# Patient Record
Sex: MALE | Race: BLACK OR AFRICAN AMERICAN | Employment: FULL TIME | URBAN - METROPOLITAN AREA
[De-identification: names, ages, dates, MRNs, and addresses within clinical notes are randomized per-mention and may not be internally consistent; named-entity substitution may affect disease eponyms.]

---

## 2019-10-01 ENCOUNTER — HOSPITAL ENCOUNTER (OUTPATIENT)
Dept: PHYSICAL THERAPY | Age: 69
Discharge: HOME | End: 2019-10-01
Payer: MEDICARE

## 2019-10-01 ENCOUNTER — HOSPITAL ENCOUNTER (OUTPATIENT)
Dept: PHYSICAL THERAPY | Age: 69
End: 2019-10-01
Payer: MEDICARE

## 2019-10-01 PROCEDURE — 97165 OT EVAL LOW COMPLEX 30 MIN: CPT

## 2019-10-01 PROCEDURE — 97018 PARAFFIN BATH THERAPY: CPT

## 2019-10-01 PROCEDURE — 97110 THERAPEUTIC EXERCISES: CPT

## 2019-10-01 NOTE — PROGRESS NOTES
[facility] [place] [employer]  [place]  [address] [facility]  [phone] fax    Plan of Care/Statement of Necessity for Occupational Therapy Services    Patient name: Lee Allen Start of Care: 10/1/2019   Referral source: Tito Camelia  : 1950    Medical Diagnosis: Pain of left thumb [M79.645]  Payor: [place] MEDICARE / Plan: [place] MEDICARE PART A & B / Product Type: Medicare /  Onset Date:unknown    Treatment Diagnosis: pain left thumb   Prior Hospitalization: see medical history Provider#: [id number]   Medications: Verified on Patient summary List    Comorbidities: B CTR, DM, HTN   Prior Level of Function: [employer]ired [employer], 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 Assessment/ key information: 76year old right hand dominant male who had slow onset of left thumb and wrist pain several years ago which comes and goes. He reports pain of 5/10. He has limited thumb AROM as follows with right hand in ( ) for comparison:  MP  0-40 (60), IP 0-60 (0-40), radial abduction 38 (52), palmar abduction 45 (48). His  and pinch are WFL. He is tender to palpation at [facility]. He has inconclusive Finklesteins test.  Thumb radial abduction is severely limited. He reports residual numbness in left thumb tip following left CTR. His fine motor skill sin left are thus reduced to 27 ( right 22). He reports difficulty with fasteners, Lifting, carrying. His FOTO is 63/100 reflecting slight impairment in function. He will benefit from skilled occupational therapy to improve left thumb ROM, reduce pain and increase function for fasteners and lifting.       Evaluation Complexity: History LOW Complexity : Brief history review  Examination LOW Complexity : 1-3 performance deficits relating to physical, cognitive , or psychosocial skils that result in activity limitations and / or participation restrictions  Clinical Decision Making LOW Complexity : No comorbidities that affect functional and no verbal or physical assistance needed to complete eval tasks   Overall Complexity Rating: LOW     Problem List: Pain effecting function, Decreased range of motion, Decreased coordination/prehension, Decreased ADL/functional abilities , Decreased flexibility/joint mobility and Sensability     Treatment Plan may include any combination of the following: Therapeutic exercise, Therapeutic activities, Physical agent/modality, Manual therapy, Patient education and ADLs/IADLs    Patient / Family readiness to learn indicated by: asking questions, trying to perform skills and interest    Persons(s) to be included in education:   patient (P)    Barriers to Learning/Limitations: None    Patient Goal (s): pain relief    Patient Self Reported Health Status: good    Rehabilitation Potential: excellent    Short Term Goals: To be accomplished in 2 weeks:  1. Patient will be independent in home exercise program for thumb ROM. 2.  Patient will be independent in beak ligament stretch to improve thumb abduction. 3.  Patient will be independent in home program for stabilizing exercises for left thumb. Long Term Goals: To be accomplished in 4 weeks:   1. Patient will improve left thumb radial abduction by at least 10 degrees to allow for improved grasp without pain. 2. Patient will report pain in left hand reduced to 0-2/10 with work and leisure activities. 3.  Patient will be able to perform fasteners with improved ease with left hand due to reduced thumb pain. 4.  Patient will report improved performance in lifting and carrying tasks as shown by increase in FOTO of at elast 5 points.         Frequency / Duration: Patient to be seen 2 times per week for 4 weeks:    Patient/ Caregiver education and instruction: Diagnosis, prognosis, self care, activity modification and exercises  [x] Plan of care has been reviewed with TOMPKINS    Certification Period: 10/1/19-10/31/19    Talya Camara, OTR/L 10/1/2019 1:27 PM      ________________________________________________________________________    I certify that the above Therapy Services are being furnished while the patient is under my care. I agree with the treatment plan and certify that this therapy is necessary.     Physician's Signature:____________Date:_________TIME:________    ** Signature, Date and Time must be completed for valid certification **    Please sign and return to [facility] [employer] [facility] [employer]   [address] [place] [address]  [phone] fax

## 2019-10-01 NOTE — PROGRESS NOTES
OT DAILY TREATMENT NOTE 10-18    Patient Name: Mitchell Johnston  Date:10/1/2019  : 1950  [x]  Patient  Verified  Payor: Juan Miguel Martins / Plan: Maurice Israel / Product Type: Commerical /    In time:1220  Out time:100  Total Treatment Time (min): 40  Visit #: 1 of 8    Medicare/BCBS Only   Total Timed Codes (min):  13 1:1 Treatment Time:  40     Treatment Area: Pain of left thumb [M79.645]    SUBJECTIVE  Pain Level (0-10 scale): 5/10  Any medication changes, allergies to medications, adverse drug reactions, diagnosis change, or new procedure performed?: [x] No    [] Yes (see summary sheet for update)  Subjective functional status/changes:   [] No changes reported  It feels better already!     OBJECTIVE    Modality rationale: decrease pain and increase tissue extensibility to improve the patients ability to grasp   Min Type Additional Details    [] Estim:  []Unatt       []IFC  []Premod                        []Other:  []w/ice   []w/heat  Position:  Location:    [] Estim: []Att    []TENS instruct  []NMES                    []Other:  []w/US   []w/ice   []w/heat  Position:  Location:    []  Traction: [] Cervical       []Lumbar                       [] Prone          []Supine                       []Intermittent   []Continuous Lbs:  [] before manual  [] after manual    []  Ultrasound: []Continuous   [] Pulsed                           []1MHz   []3MHz W/cm2:  Location:    []  Iontophoresis with dexamethasone         Location: [] Take home patch   [] In clinic    []  Ice     []  heat  []  Ice massage  []  Laser   []  Anodyne Position:  Location:    []  Laser with stim  [x]  Other: paraffin 10 Position:  Location:left hand    []  Vasopneumatic Device Pressure:       [] lo [] med [] hi   Temperature: [] lo [] med [] hi       [x] Skin assessment post-treatment:  [x]intact []redness- no adverse reaction    []redness - adverse reaction:     17 min [x]Eval                  []Re-Eval       8 min Therapeutic Exercise: [] See flow sheet :   Rationale: increase ROM to improve the patients ability to move thumb    Thumb rad abd, palmar abd and C and L x 10 each left  Thumb hyperext x 10 left    5 min Manual therapy Beak ligament release  Instructed in beak ligament stretch for ROM radial abduction left    With   [] TE   [] TA   [] neuro   [] other: Patient Education: [x] Review HEP    [] Progressed/Changed HEP based on:   [] positioning   [] body mechanics   [] transfers   [] heat/ice application   [] Splint wear/care   [] Sensory re-education   [] scar management      [] other:            Other Objective/Functional Measures:   Subjective: pt is a right hand dominant, 76 y.o.y/o, male who has had slow onset of left thumb pain with no apparent injury  Prior level of function: REtired shipyard, I self care driving, works cleaning at food court  Pain level:(0-no pain 10-debilitating pain) moderate    Description/Location: left thumb and left wrist with c/o intermittent relief   Worst pain10/10 Least pain 0/10   Activities which aggravate pain: unknown   Activities which ease pain: heat  Current functional limitations/living situation: with wife, difficulty with buttons, fasteners    Medical hx: B CTR, Dm, HTN    Medications: See the written copy of this report in the patient's paper medical record. Objective:    Sensation:  Light Touch []WFL          [x] Impaired left thumb tip since before CTR   Sharp/Dull []WFL          [] Impaired    Pain/Temperature []WFL          [] Impaired        Hand ROM R/L   Index Middle Ring Small Thumb    MP      CMC   PIP     60/40 MP   DIP     40/60 IP        48/45 Sanders Abd        52/38 Radial Abd     Hand Strength:   Gross Grasp 3pt Pinch Lateral Pinch Tip Pinch   Right  65 18 26 14   Left 75 20 22 14     Nine-Hole Peg Test:  Left= ___27__seconds  Right=_22____seconds  Finger Opposition:  To 5th tip        Palpation: CMC pain    ADLs  Feeding:        []MaxA   []ModA   []Carlos   [] CGA   []SBA []Yandel   [x]Independent  UE Dressing:       []MaxA   []ModA   [x]Carlos   [] CGA   []SBA   []Yandel   []Independent  LE Dressing:       []MaxA   []ModA   [x]Carlos   [] CGA   []SBA   []Yandel   []Independent  Grooming:       []MaxA   []ModA   []Carlos   [] CGA   []SBA   []Yandel   [x]Independent  Toileting:       []MaxA   []ModA   []Carlos   [] CGA   []SBA   []Yandel   [x]Independent  Bathing:       []MaxA   []ModA   []Carlos   [] CGA   []SBA   []Yandel   [x]Independent  Light Meal Prep:    []MaxA   []ModA   []Carlos   [] CGA   []SBA   []Yandel   []Independent  Household/Other: []MaxA   []ModA   []Carlos   [] CGA   []SBA   []Yandel   []Independent  Adaptive Equip:     []MaxA   []ModA   []Carlos   [] CGA   []SBA   []Yandel   []Independent  Driving:       []MaxA   []ModA   []Carlos   [] CGA   []SBA   []Yandel   []Independent  Money Mgmt:        []MaxA   []ModA   []Carlos   [] CGA   []SBA   []Yandel   []Independent       Pain Level (0-10 scale) post treatment: 3/10    ASSESSMENT/Changes in Function: *   [x]  See Plan of Care  []  See progress note/recertification  []  See Discharge Summary             PLAN  []  Upgrade activities as tolerated     []  Continue plan of care  []  Update interventions per flow sheet       []  Discharge due to:_  []  Other:_      SAMM Gandhi/L 10/1/2019  12:18 PM    No future appointments.

## 2019-10-04 ENCOUNTER — HOSPITAL ENCOUNTER (OUTPATIENT)
Dept: PHYSICAL THERAPY | Age: 69
Discharge: HOME | End: 2019-10-04
Payer: MEDICARE

## 2019-10-04 PROCEDURE — 97110 THERAPEUTIC EXERCISES: CPT

## 2019-10-04 PROCEDURE — 97140 MANUAL THERAPY 1/> REGIONS: CPT

## 2019-10-04 PROCEDURE — 97018 PARAFFIN BATH THERAPY: CPT

## 2019-10-04 NOTE — PROGRESS NOTES
OT DAILY TREATMENT NOTE 10-18    Patient Name: Jaja Hylton.   Date:10/4/2019  : 1950  [x]  Patient  Verified  Payor: VA MEDICARE / Plan: VA MEDICARE PART A & B / Product Type: Medicare /    In time:900  Out time:945  Total Treatment Time (min): 45  Visit #: 2 of 8    Medicare/BCBS Only   Total Timed Codes (min):  35 1:1 Treatment Time:  45     Treatment Area: Pain of left thumb [M79.645]    SUBJECTIVE  Pain Level (0-10 scale): 3/10  Any medication changes, allergies to medications, adverse drug reactions, diagnosis change, or new procedure performed?: [x] No    [] Yes (see summary sheet for update)  Subjective functional status/changes:   [] No changes reported  I can see the difference the exercises make     OBJECTIVE           Modality rationale: decrease pain and increase tissue extensibility to improve the patients ability to grasp   Min Type Additional Details      [] Estim:  []Unatt       []IFC  []Premod                        []Other:  []w/ice   []w/heat  Position:  Location:      [] Estim: []Att    []TENS instruct  []NMES                    []Other:  []w/US   []w/ice   []w/heat  Position:  Location:      []  Traction: [] Cervical       []Lumbar                       [] Prone          []Supine                       []Intermittent   []Continuous Lbs:  [] before manual  [] after manual      []  Ultrasound: []Continuous   [] Pulsed                           []1MHz   []3MHz W/cm2:  Location:      []  Iontophoresis with dexamethasone         Location: [] Take home patch   [] In clinic      []  Ice     []  heat  []  Ice massage  []  Laser   []  Anodyne Position:  Location:     10 []  Laser with stim  [x]  Other: paraffin 10 Position:  Location:left hand      []  Vasopneumatic Device Pressure:       [] lo [] med [] hi   Temperature: [] lo [] med [] hi       [x] Skin assessment post-treatment:  [x]intact []redness- no adverse reaction  []redness - adverse reaction:     25 min Therapeutic Exercise:  [] See flow sheet :   Rationale: increase ROM and increase strength to improve the patients ability to freely use hand, thumb     Thumb:   Radial Abduction, Palmar Abduction, MP Hyperextension, Composite Abduction    Opposition with Foam: \"C\"     10 min Manual Therapy:  Ligament release   Rationale: decrease pain, increase ROM and increase tissue extensibility to improve ability to functionally use hand, move thumb freely     Beak ligament release  Palmar Stretch         With   [] TE   [] TA   [] neuro   [] other: Patient Education: [x] Review HEP    [] Progressed/Changed HEP based on:   [] positioning   [] body mechanics   [] transfers   [] heat/ice application   [] Splint wear/care   [] Sensory re-education   [] scar management      [] other:            Other Objective/Functional Measures:     Cueing to not pull thumb down for HEP, cueing to hold      Pain Level (0-10 scale) post treatment: 2/10    ASSESSMENT/Changes in Function: ROM improving, Patient reporting improved function at this time    Patient will continue to benefit from skilled OT services to modify and progress therapeutic interventions, address ROM deficits, address strength deficits and instruct in home and community integration to attain remaining goals. []  See Plan of Care  []  See progress note/recertification  []  See Discharge Summary         Progress towards goals / Updated goals:    Short Term Goals: To be accomplished in 2 weeks:  1. Patient will be independent in home exercise program for thumb ROM. Cueing required 10/4/19  2. Patient will be independent in beak ligament stretch to improve thumb abduction. Goal Met 10/4/19  3. Patient will be independent in home program for stabilizing exercises for left thumb.     Long Term Goals: To be accomplished in 4 weeks:              1.  Patient will improve left thumb radial abduction by at least 10 degrees to allow for improved grasp without pain.   2. Patient will report pain in left hand reduced to 0-2/10 with work and leisure activities. 3.  Patient will be able to perform fasteners with improved ease with left hand due to reduced thumb pain. 4.  Patient will report improved performance in lifting and carrying tasks as shown by increase in FOTO of at elast 5 points.     PLAN  []  Upgrade activities as tolerated     [x]  Continue plan of care  []  Update interventions per flow sheet       []  Discharge due to:_  []  Other:_      Inell SARAH Varner 10/4/2019  8:27 AM    Future Appointments   Date Time Provider Ady Gay   10/9/2019 10:15 AM Emily OLEARY [facility]   10/11/2019  7:30 AM Emily TIRADO [facility]

## 2019-10-09 ENCOUNTER — HOSPITAL ENCOUNTER (OUTPATIENT)
Dept: PHYSICAL THERAPY | Age: 69
Discharge: HOME | End: 2019-10-09
Payer: MEDICARE

## 2019-10-09 PROCEDURE — 97032 APPL MODALITY 1+ESTIM EA 15: CPT

## 2019-10-09 PROCEDURE — 97110 THERAPEUTIC EXERCISES: CPT

## 2019-10-09 PROCEDURE — 97140 MANUAL THERAPY 1/> REGIONS: CPT

## 2019-10-09 NOTE — PROGRESS NOTES
OT DAILY TREATMENT NOTE 10-18    Patient Name: Kaylyn Harrison. Date:10/9/2019  : 1950  [x]  Patient  Verified  Payor: VA MEDICARE / Plan: VA MEDICARE PART A & B / Product Type: Medicare /    In time:945  Out time:1025  Total Treatment Time (min): 40  Visit #: 3 of 8    Medicare/BCBS Only   Total Timed Codes (min):  30 1:1 Treatment Time:  40     Treatment Area: Pain of left thumb [M79.645]    SUBJECTIVE  Pain Level (0-10 scale): 5/10  Any medication changes, allergies to medications, adverse drug reactions, diagnosis change, or new procedure performed?: [x] No    [] Yes (see summary sheet for update)  Subjective functional status/changes:   [] No changes reported  It was hurting a lot more yesterday. Maybe it does have something to do with the weather.        OBJECTIVE              Modality rationale: decrease pain and increase tissue extensibility to improve the patients ability to grasp   Min Type Additional Details       [] Estim:  []Unatt       []IFC  []Premod                        []Other:  []w/ice   []w/heat  Position:  Location:       [] Estim: [x]Att    []TENS instruct  []NMES                    []Other:  []w/US   []w/ice   []w/heat  Position:  Location:       []  Traction: [] Cervical       []Lumbar                       [] Prone          []Supine                       []Intermittent   []Continuous Lbs:  [] before manual  [] after manual       []  Ultrasound: []Continuous   [] Pulsed                           []1MHz   []3MHz W/cm2:  Location:       []  Iontophoresis with dexamethasone         Location: [] Take home patch   [] In clinic       []  Ice     []  heat  []  Ice massage  []  Laser   []  Anodyne Position:  Location:      8/10 [x]  Laser with stim   [x]  Other: paraffin 10 Position:  Location:left hand       []  Vasopneumatic Device Pressure:       [] lo [] med [] hi   Temperature: [] lo [] med [] hi        [x] Skin assessment post-treatment:  [x]intact []redness- no adverse reaction  []redness - adverse reaction:     10 min Therapeutic Exercise:  [] See flow sheet :   Rationale: increase ROM and increase strength to improve the patients ability to functionally use hand     Thumb:              Radial Abduction, Palmar Abduction, MP Hyperextension, Composite Abduction      12 min Manual Therapy:  Edema Massage/Beak ligament release   Rationale: decrease pain, increase ROM, increase tissue extensibility and decrease edema  to improve function of Left hand, move thumb freely     Edema Massage to thenar, thenar eminence  Palmar Stretch   Beak ligament release          With   [] TE   [] TA   [] neuro   [] other: Patient Education: [x] Review HEP    [] Progressed/Changed HEP based on:   [] positioning   [] body mechanics   [] transfers   [] heat/ice application   [] Splint wear/care   [] Sensory re-education   [] scar management      [] other:            Other Objective/Functional Measures:     Decreased swelling post massage     Cueing to hold with HEP      Pain Level (0-10 scale) post treatment: 2/10    ASSESSMENT/Changes in Function: decreased pain with modalities     Patient will continue to benefit from skilled OT services to modify and progress therapeutic interventions, address ROM deficits, address strength deficits and instruct in home and community integration to attain remaining goals. []  See Plan of Care  []  See progress note/recertification  []  See Discharge Summary         Progress towards goals / Updated goals:  Short Term Goals: To be accomplished in 2 weeks:  1.  Patient will be independent in home exercise program for thumb ROM. Cueing required 10/4/19  2.  Patient will be independent in beak ligament stretch to improve thumb abduction.  Goal Met 10/4/19  3.  Patient will be independent in home program for stabilizing exercises for left thumb.     Long Term Goals: To be accomplished in 4 weeks:              1.  Patient will improve left thumb radial abduction by at least 10 degrees to allow for improved grasp without pain. Progressing with in clinic tasks/HEP 10/9/19  2. Patient will report pain in left hand reduced to 0-2/10 with work and leisure activities. 3.  Patient will be able to perform fasteners with improved ease with left hand due to reduced thumb pain.   4.  Patient will report improved performance in lifting and carrying tasks as shown by increase in FOTO of at elast 5 points.      PLAN  []  Upgrade activities as tolerated     [x]  Continue plan of care  []  Update interventions per flow sheet       []  Discharge due to:_  []  Other:_      [place] [employer] ,TOMPKINS/L 10/9/2019  9:12 AM    Future Appointments   Date Time Provider Ady Gay   10/11/2019  7:30 AM Severiano Reddish AXIKUHG [facility]   10/15/2019 10:30 AM Herminia Lowe VNQMIKE [facility]   10/18/2019  8:15 AM Severiano Reddish KCUFEJY [facility]

## 2019-10-11 ENCOUNTER — HOSPITAL ENCOUNTER (OUTPATIENT)
Dept: PHYSICAL THERAPY | Age: 69
Discharge: HOME | End: 2019-10-11
Payer: MEDICARE

## 2019-10-11 PROCEDURE — 97032 APPL MODALITY 1+ESTIM EA 15: CPT

## 2019-10-11 PROCEDURE — 97018 PARAFFIN BATH THERAPY: CPT

## 2019-10-11 PROCEDURE — 97530 THERAPEUTIC ACTIVITIES: CPT

## 2019-10-11 NOTE — PROGRESS NOTES
OT DAILY TREATMENT NOTE 10-18    Patient Name: Charley Farias.   Date:10/11/2019  : 1950  [x]  Patient  Verified  Payor: Ashley Turner / Plan: VA MEDICARE PART A & B / Product Type: Medicare /    In time:730  Out time:810  Total Treatment Time (min): 40  Visit #: 4 of 8    Medicare/BCBS Only   Total Timed Codes (min):  30 1:1 Treatment Time:  40     Treatment Area: Pain of left thumb [M79.645]    SUBJECTIVE  Pain Level (0-10 scale): 3/10  Any medication changes, allergies to medications, adverse drug reactions, diagnosis change, or new procedure performed?: [x] No    [] Yes (see summary sheet for update)  Subjective functional status/changes:   [] No changes reported  No pain really     OBJECTIVE                Modality rationale: decrease pain and increase tissue extensibility to improve the patients ability to grasp   Min Type Additional Details       [] Estim:  []Unatt       []IFC  []Premod                        []Other:  []w/ice   []w/heat  Position:  Location:       [] Estim: [x]Att    []TENS instruct  []NMES                    []Other:  []w/US   []w/ice   []w/heat  Position:  Location:       []  Traction: [] Cervical       []Lumbar                       [] Prone          []Supine                       []Intermittent   []Continuous Lbs:  [] before manual  [] after manual       []  Ultrasound: []Continuous   [] Pulsed                           []1MHz   []3MHz W/cm2:  Location:       []  Iontophoresis with dexamethasone         Location: [] Take home patch   [] In clinic       []  Ice     []  heat  []  Ice massage  []  Laser   []  Anodyne Position:  Location:      8/10 [x]  Laser with stim   [x]  Other: paraffin 10 Position:  Location:left hand       []  Vasopneumatic Device Pressure:       [] lo [] med [] hi   Temperature: [] lo [] med [] hi        [x] Skin assessment post-treatment:  [x]intact []redness- no adverse reaction  []redness - adverse reaction:     20 min Therapeutic Activity:  []  See flow sheet :   Rationale: increase ROM and improve coordination  to improve the patients ability to pinch, grasp, manipulate small items    1/4 in pegs: using recip opp to place/remove pegs 35x            With   [] TE   [] TA   [] neuro   [] other: Patient Education: [x] Review HEP    [] Progressed/Changed HEP based on:   [] positioning   [] body mechanics   [] transfers   [] heat/ice application   [] Splint wear/care   [] Sensory re-education   [] scar management      [] other:            Other Objective/Functional Measures:     Cueing for proper positioning      Pain Level (0-10 scale) post treatment: 0/10    ASSESSMENT/Changes in Function: ROM improving, decrease in pain     Patient will continue to benefit from skilled OT services to modify and progress therapeutic interventions, address ROM deficits, address strength deficits and instruct in home and community integration to attain remaining goals. []  See Plan of Care  []  See progress note/recertification  []  See Discharge Summary         Progress towards goals / Updated goals:  Short Term Goals: To be accomplished in 2 weeks:  1.  Patient will be independent in home exercise program for thumb ROM. Cueing required 10/4/19  2.  Patient will be independent in beak ligament stretch to improve thumb abduction. Goal Met 10/4/19  3.  Patient will be independent in home program for stabilizing exercises for left thumb.     Long Term Goals: To be accomplished in 4 weeks:              1.  Patient will improve left thumb radial abduction by at least 10 degrees to allow for improved grasp without pain. Progressing with in clinic tasks/HEP 10/9/19  2. Patient will report pain in left hand reduced to 0-2/10 with work and leisure activities. Met for today, check for consistency 10/11/19  3.  Patient will be able to perform fasteners with improved ease with left hand due to reduced thumb pain.   4.  Patient will report improved performance in lifting and carrying tasks as shown by increase in FOTO of at elast 5 points.      PLAN  []  Upgrade activities as tolerated     [x]  Continue plan of care  []  Update interventions per flow sheet        []  Discharge due to:_  []  Other:_      SARAH Busch 10/11/2019  7:39 AM    Future Appointments   Date Time Provider Ady Gay   10/15/2019 10:30 AM Maxime Reeves XTSLTHA [facility]   10/18/2019  8:15 AM Severa Saba IJGGJANINE [facility]

## 2019-10-15 ENCOUNTER — HOSPITAL ENCOUNTER (OUTPATIENT)
Dept: PHYSICAL THERAPY | Age: 69
Discharge: HOME | End: 2019-10-15
Payer: MEDICARE

## 2019-10-15 PROCEDURE — 97110 THERAPEUTIC EXERCISES: CPT

## 2019-10-15 PROCEDURE — 97018 PARAFFIN BATH THERAPY: CPT

## 2019-10-15 PROCEDURE — 97140 MANUAL THERAPY 1/> REGIONS: CPT

## 2019-10-15 NOTE — PROGRESS NOTES
OT DAILY TREATMENT NOTE 10-18    Patient Name: Valentín Andrews. Date:10/15/2019  : 1950  [x]  Patient  Verified  Payor: Christal Hind / Plan: [place] & [place] / Product Type: Medicare /    In time:   Out time:   Total Treatment Time (min): 45  Visit #: 5 of 8    Medicare/BCBS Only   Total Timed Codes (min):  35 1:1 Treatment Time:  45     Treatment Area: Pain of left thumb [M79.645]    SUBJECTIVE  Pain Level (0-10 scale): 3/10  Any medication changes, allergies to medications, adverse drug reactions, diagnosis change, or new procedure performed?: [x] No    [] Yes (see summary sheet for update)  Subjective functional status/changes:   [] No changes reported  Not too bad, it's manageable.  It's been going well with the exercises     OBJECTIVE                 Modality rationale: decrease pain and increase tissue extensibility to improve the patients ability to grasp   Min Type Additional Details       [] Estim:  []Unatt       []IFC  []Premod                        []Other:  []w/ice   []w/heat  Position:  Location:       [] Estim: [x]Att    []TENS instruct  []NMES                    []Other:  []w/US   []w/ice   []w/heat  Position:  Location:       []  Traction: [] Cervical       []Lumbar                       [] Prone          []Supine                       []Intermittent   []Continuous Lbs:  [] before manual  [] after manual       []  Ultrasound: []Continuous   [] Pulsed                           []1MHz   []3MHz W/cm2:  Location:       []  Iontophoresis with dexamethasone         Location: [] Take home patch   [] In clinic       []  Ice     []  heat  []  Ice massage  []  Laser   []  Anodyne Position:  Location:      8/10 [x]  Laser with stim   [x]  Other: paraffin 10 Position:  Location:left hand       []  Vasopneumatic Device Pressure:       [] lo [] med [] hi   Temperature: [] lo [] med [] hi        [x] Skin assessment post-treatment:  [x]intact []redness- no adverse reaction  []redness - adverse reaction:     10 min Therapeutic Exercise:  [] See flow sheet :   Rationale: increase ROM and increase strength to improve the patients ability to use left hand     Thumb 10x each:    Radial abduction, Palmar Abduction, MP Hyperextension, Composite Abdcution      12 min Manual Therapy:  Edema massage/beak ligament release    Rationale: decrease pain, increase ROM, increase tissue extensibility and decrease edema  to improve function of left hand, move thumb freely     Edema massage to thenar, thenar eminence  Palmar stretch  Beak ligament release         With   [] TE   [] TA   [] neuro   [] other: Patient Education: [x] Review HEP    [] Progressed/Changed HEP based on:   [] positioning   [] body mechanics   [] transfers   [] heat/ice application   [] Splint wear/care   [] Sensory re-education   [] scar management      [] other:            Other Objective/Functional Measures: decreased swelling post massage     Pain Level (0-10 scale) post treatment: 2/10    ASSESSMENT/Changes in Function: decreased pain with modalities in clinic     Patient will continue to benefit from skilled OT services to modify and progress therapeutic interventions, address ROM deficits, address strength deficits and instruct in home and community integration to attain remaining goals. []  See Plan of Care  []  See progress note/recertification  []  See Discharge Summary         Progress towards goals / Updated goals:  Short Term Goals: To be accomplished in 2 weeks:  1.  Patient will be independent in home exercise program for thumb ROM. Cueing required 10/4/19  2.  Patient will be independent in beak ligament stretch to improve thumb abduction. Goal Met 10/4/19, able to demo 10/15  3.  Patient will be independent in home program for stabilizing exercises for left thumb, Goal met 10/15     Long Term Goals: To be accomplished in 4 weeks:              1.  Patient will improve left thumb radial abduction by at least 10 degrees to allow for improved grasp without pain. Progressing with in clinic tasks/HEP 10/9/19  2. Patient will report pain in left hand reduced to 0-2/10 with work and leisure activities. Met for today, check for consistency 10/11/19  3.  Patient will be able to perform fasteners with improved ease with left hand due to reduced thumb pain.   4.  Patient will report improved performance in lifting and carrying tasks as shown by increase in FOTO of at elast 5 points.      PLAN  []  Upgrade activities as tolerated     [x]  Continue plan of care  []  Update interventions per flow sheet       []  Discharge due to:_  []  Other:_      SARAH Chan 10/15/2019  10:37 AM    Future Appointments   Date Time Provider Ady Gay   10/18/2019  8:15 AM Xiomy Sotomayor [id number]

## 2019-10-18 ENCOUNTER — HOSPITAL ENCOUNTER (OUTPATIENT)
Dept: PHYSICAL THERAPY | Age: 69
Discharge: HOME | End: 2019-10-18
Payer: MEDICARE

## 2019-10-18 PROCEDURE — 97530 THERAPEUTIC ACTIVITIES: CPT

## 2019-10-18 PROCEDURE — 97110 THERAPEUTIC EXERCISES: CPT

## 2019-10-18 PROCEDURE — 97018 PARAFFIN BATH THERAPY: CPT

## 2019-10-18 NOTE — PROGRESS NOTES
OT DAILY TREATMENT NOTE 10-18    Patient Name: Mariola Green. Date:10/18/2019  : 1950  [x]  Patient  Verified  Payor: Abdias Snare / Plan: [place] MEDICARE PART A & B / Product Type: Medicare /    In time:815  Out time:900  Total Treatment Time (min): 45  Visit #: 6 of 8    Medicare/BCBS Only   Total Timed Codes (min):  35 1:1 Treatment Time:  45     Treatment Area: Pain of left thumb [M79.645]    SUBJECTIVE  Pain Level (0-10 scale): 3/10  Any medication changes, allergies to medications, adverse drug reactions, diagnosis change, or new procedure performed?: [x] No    [] Yes (see summary sheet for update)  Subjective functional status/changes:   [] No changes reported  The tip of my fingers are starting to go a bit numb.      OBJECTIVE            Modality rationale: decrease pain and increase tissue extensibility to improve the patients ability to grasp   Min Type Additional Details       [] Estim:  []Unatt       []IFC  []Premod                        []Other:  []w/ice   []w/heat  Position:  Location:       [] Estim: [x]Att    []TENS instruct  []NMES                    []Other:  []w/US   []w/ice   []w/heat  Position:  Location:       []  Traction: [] Cervical       []Lumbar                       [] Prone          []Supine                       []Intermittent   []Continuous Lbs:  [] before manual  [] after manual       []  Ultrasound: []Continuous   [] Pulsed                           []1MHz   []3MHz W/cm2:  Location:       []  Iontophoresis with dexamethasone         Location: [] Take home patch   [] In clinic       []  Ice     []  heat  []  Ice massage  []  Laser   []  Anodyne Position:  Location:      8/10 [x]  Laser with stim   [x]  Other: paraffin 10 Position:  Location:left hand       []  Vasopneumatic Device Pressure:       [] lo [] med [] hi   Temperature: [] lo [] med [] hi        [x] Skin assessment post-treatment:  [x]intact []redness- no adverse reaction  []redness - adverse reaction:     15 min Therapeutic Exercise:  [] See flow sheet :   Rationale: increase ROM and increase strength to improve the patients ability to grasp    Dexterity Balls: 20x '  Digit extension/abduction with rubber band to place onto cup- 2 rounds       12 min Therapeutic Activity:  []  See flow sheet :   Rationale: increase ROM and improve coordination  to improve the patients ability to move thumb    Coins translated palm <> digit for /placement into coin slots  In sets of 5        With   [] TE   [] TA   [] neuro   [] other: Patient Education: [x] Review HEP    [] Progressed/Changed HEP based on:   [] positioning   [] body mechanics   [] transfers   [] heat/ice application   [] Splint wear/care   [] Sensory re-education   [] scar management      [] other:            Other Objective/Functional Measures:     Initial difficulty with dexterity balls     Pain Level (0-10 scale) post treatment: 1/10    ASSESSMENT/Changes in Function: ROM improving, pain decreasing     Patient will continue to benefit from skilled OT services to modify and progress therapeutic interventions, address ROM deficits, address strength deficits and instruct in home and community integration to attain remaining goals. []  See Plan of Care  []  See progress note/recertification  []  See Discharge Summary         Progress towards goals / Updated goals:  Short Term Goals: To be accomplished in 2 weeks:  1.  Patient will be independent in home exercise program for thumb ROM. Cueing required 10/4/19  2.  Patient will be independent in beak ligament stretch to improve thumb abduction. Goal Met 10/4/19, able to demo 10/15  3.  Patient will be independent in home program for stabilizing exercises for left thumb, Goal met 10/15     Long Term Goals: To be accomplished in 4 weeks:              1.  Patient will improve left thumb radial abduction by at least 10 degrees to allow for improved grasp without pain. Progressing with in clinic tasks/HEP 10/9/19  2. Patient will report pain in left hand reduced to 0-2/10 with work and leisure activities. Met for today, check for consistency 10/11/19  3.  Patient will be able to perform fasteners with improved ease with left hand due to reduced thumb pain.   4.  Patient will report improved performance in lifting and carrying tasks as shown by increase in FOTO of at elast 5 points.      PLAN  []  Upgrade activities as tolerated     [x]  Continue plan of care  []  Update interventions per flow sheet       []  Discharge due to:_  []  Other:_      SARAH Vasquez 10/18/2019  8:21 AM    Future Appointments   Date Time Provider Ady Gay   10/23/2019  2:30 PM Xiomy [facility]

## 2019-10-23 ENCOUNTER — HOSPITAL ENCOUNTER (OUTPATIENT)
Dept: PHYSICAL THERAPY | Age: 69
Discharge: HOME | End: 2019-10-23
Payer: MEDICARE

## 2019-10-23 PROCEDURE — 97032 APPL MODALITY 1+ESTIM EA 15: CPT

## 2019-10-23 PROCEDURE — 97110 THERAPEUTIC EXERCISES: CPT

## 2019-10-23 PROCEDURE — 97018 PARAFFIN BATH THERAPY: CPT

## 2019-10-23 NOTE — PROGRESS NOTES
OT DAILY TREATMENT NOTE 10-18    Patient Name: Kaylyn Harrison. Date:10/23/2019  : 1950  [x]  Patient  Verified  Payor: Kevin Westfall / Plan: [place] MEDICARE PART A & B / Product Type: Medicare /    In time:225  Out time:305  Total Treatment Time (min): 40  Visit #: 7 of 8    Medicare/BCBS Only   Total Timed Codes (min):  30 1:1 Treatment Time:  40     Treatment Area: Pain of left thumb [M79.645]    SUBJECTIVE  Pain Level (0-10 scale): 1/10  Any medication changes, allergies to 3medications, adverse drug reactions, diagnosis change, or new procedure performed?: [x] No    [] Yes (see summary sheet for update)  Subjective functional status/changes:   [] No changes reported  I can sleep at night now. I have more use of my hand now. I didn't believe in therapy but now I do.    OBJECTIVE    Modality rationale: decrease pain and increase tissue extensibility to improve the patients ability to grasp   Min Type Additional Details       [] Estim:  []Unatt       []IFC  []Premod                        []Other:  []w/ice   []w/heat  Position:  Location:       [] Estim: [x]Att    []TENS instruct  []NMES                    []Other:  []w/US   []w/ice   []w/heat  Position:  Location:       []  Traction: [] Cervical       []Lumbar                       [] Prone          []Supine                       []Intermittent   []Continuous Lbs:  [] before manual  [] after manual      []  Ultrasound: []Continuous   [] Pulsed                           []1MHz   []3MHz W/cm2:  Location:        []  Iontophoresis with dexamethasone         Location: [] Take home patch   [] In clinic       []  Ice     []  heat  []  Ice massage  []  Laser   []  Anodyne Position:  Location:      8/10 [x]  Laser with stim   [x]  Other: paraffin 10 Position:  Location:left hand       []  Vasopneumatic Device Pressure:       [] lo [] med [] hi   Temperature: [] lo [] med [] hi        [x] Skin assessment post-treatment:  [x]intact []redness- no adverse reaction  []redness - adverse reaction:     22 min Therapeutic Exercise:  [] See flow sheet :   Rationale: increase ROM and increase strength to improve the patients ability to grasp    Digit extension with rubber bands  Medium theraputty: 10/10  Dexterity Balls   Thumb 10x each:               Radial abduction, Palmar Abduction, MP Hyperextension, Composite Abduction     With   [] TE   [] TA   [] neuro   [] other: Patient Education: [x] Review HEP    [] Progressed/Changed HEP based on:   [] positioning   [] body mechanics   [] transfers   [] heat/ice application   [] Splint wear/care   [] Sensory re-education   [] scar management      [] other:            Other Objective/Functional Measures:     IND with HEP  No pain/discomfort reported with addition of theraputty        Pain Level (0-10 scale) post treatment: 0/10    ASSESSMENT/Changes in Function: Plan for discharge next session, ROM improving    Patient will continue to benefit from skilled OT services to modify and progress therapeutic interventions, address ROM deficits, address strength deficits and instruct in home and community integration to attain remaining goals. []  See Plan of Care  []  See progress note/recertification  []  See Discharge Summary         Progress towards goals / Updated goals:  Short Term Goals: To be accomplished in 2 weeks:  1.  Patient will be independent in home exercise program for thumb ROM. Cueing required 10/4/19  2.  Patient will be independent in beak ligament stretch to improve thumb abduction. Goal Met 10/4/19, able to demo 10/15  3.  Patient will be independent in home program for stabilizing exercises for left thumb, Goal met 10/15     Long Term Goals: To be accomplished in 4 weeks:              1.  Patient will improve left thumb radial abduction by at least 10 degrees to allow for improved grasp without pain. Progressing with in clinic tasks/HEP 10/9/19  2.  Patient will report pain in left hand reduced to 0-2/10 with work and leisure activities. Met for today, check for consistency 10/11/19  3.  Patient will be able to perform fasteners with improved ease with left hand due to reduced thumb pain.  Improvement per patient report 10/23/2019  4.  Patient will report improved performance in lifting and carrying tasks as shown by increase in FOTO of at elast 5 points.      PLAN  []  Upgrade activities as tolerated     [x]  Continue plan of care  []  Update interventions per flow sheet       []  Discharge due to:_  []  Other:_      SARAH Simon 10/23/2019  1:48 PM    Future Appointments   Date Time Provider Ady Gay   10/24/2019 12:00 PM SAMM Major/JUAN MANUEL [id number]

## 2019-10-24 ENCOUNTER — HOSPITAL ENCOUNTER (OUTPATIENT)
Dept: PHYSICAL THERAPY | Age: 69
Discharge: HOME | End: 2019-10-24
Payer: MEDICARE

## 2019-10-24 PROCEDURE — 97110 THERAPEUTIC EXERCISES: CPT

## 2019-10-24 PROCEDURE — 97018 PARAFFIN BATH THERAPY: CPT

## 2019-10-24 PROCEDURE — 97530 THERAPEUTIC ACTIVITIES: CPT

## 2019-10-24 NOTE — PROGRESS NOTES
OT DAILY TREATMENT NOTE 10-18    Patient Name: Eliane Lezama.   Date:10/24/2019  : 1950  [x]  Patient  Verified  Payor: [place] MEDICARE / Plan: [place] MEDICARE PART A & B / Product Type: Medicare /    In time:1200  Out time:1245  Total Treatment Time (min): 45  Visit #: 8 of 8    Medicare/BCBS Only   Total Timed Codes (min):  35 1:1 Treatment Time:  40     Treatment Area: Pain of left thumb [M79.645]    SUBJECTIVE  Pain Level (0-10 scale): 0/10  Any medication changes, allergies to medications, adverse drug reactions, diagnosis change, or new procedure performed?: [x] No    [] Yes (see summary sheet for update)  Subjective functional status/changes:   [] No changes reported  I did not believe this would work but it kera really helped    OBJECTIVE    Modality rationale: decrease pain and increase tissue extensibility to improve the patients ability to grasp   Min Type Additional Details    [] Estim:  []Unatt       []IFC  []Premod                        []Other:  []w/ice   []w/heat  Position:  Location:    [] Estim: []Att    []TENS instruct  []NMES                    []Other:  []w/US   []w/ice   []w/heat  Position:  Location:    []  Traction: [] Cervical       []Lumbar                       [] Prone          []Supine                       []Intermittent   []Continuous Lbs:  [] before manual  [] after manual    []  Ultrasound: []Continuous   [] Pulsed                           []1MHz   []3MHz W/cm2:  Location:    []  Iontophoresis with dexamethasone         Location: [] Take home patch   [] In clinic    []  Ice     []  heat  []  Ice massage  []  Laser   []  Anodyne Position:  Location:     10 []  Laser with stim  [x]  Other: Paraffin Position:  Location:left hand    []  Vasopneumatic Device Pressure:       [] lo [] med [] hi   Temperature: [] lo [] med [] hi       [x] Skin assessment post-treatment:  [x]intact []redness- no adverse reaction    []redness - adverse reaction:       20 min Therapeutic Exercise:  [] See flow sheet :   Rationale: increase ROM to improve the patients ability to move thumb  Recheck of thumb ROM left  Dexterity balls x 20 each left hand  Digit thumb abd with left hand and rubber bands         15 min Therapeutic Activity:  []  See flow sheet :   Rationale: improve coordination  to improve the patients ability to manipulate items  Recheck 9 hole peg test left   Review of  Sensory kit       With   [] TE   [] TA   [] neuro   [] other: Patient Education: [x] Review HEP    [] Progressed/Changed HEP based on: Final HEP  [] positioning   [] body mechanics   [] transfers   [] heat/ice application   [] Splint wear/care   [] Sensory re-education   [] scar management      [] other:            Other Objective/Functional Measures:     Hand A/PROM left Index Middle Ring Small Thumb    MP      CMC   PIP     60 MP   DIP     65 IP         Sanders Abd        55 Radial Abd     9 hole 26     Pain Level (0-10 scale) post treatment: 0/10    ASSESSMENT/Changes in Function: iMproved ROM, pain     []  See Plan of Care  []  See progress note/recertification  []  See Discharge Summary         Progress towards goals / Updated goals:  *1.  Patient will be independent in home exercise program for thumb ROM. Cueing required 10/4/19, met 10/24/19  2.  Patient will be independent in beak ligament stretch to improve thumb abduction. Goal Met 10/4/19, able to demo 10/15  3.  Patient will be independent in home program for stabilizing exercises for left thumb, Goal met 10/15     Long Term Goals: To be accomplished in 4 weeks:              1.  Patient will improve left thumb radial abduction by at least 10 degrees to allow for improved grasp without pain. Progressing with in clinic tasks/HEP 10/9/19, met 10/24/19  2.  Patient will report pain in left hand reduced to 0-2/10 with work and leisure activities. Met for today, check for consistency 10/11/19, met 10/24/19  3.  Patient will be able to perform fasteners with improved ease with left hand due to reduced thumb pain.  Met  per patient report 10/23/2019  4.  Patient will report improved performance in lifting and carrying tasks as shown by increase in FOTO of at elast 5 points.  met 10/24/19    PLAN  []  Upgrade activities as tolerated     []  Continue plan of care  []  Update interventions per flow sheet       [x]  Discharge due to:_  []  Other:_      SAMM Irvin/L 10/24/2019  10:31 AM    Future Appointments   Date Time Provider Ady Gay   10/24/2019 12:00 PM Claudetta Hickory, OTR/L [id number]

## 2019-10-28 NOTE — PROGRESS NOTES
[facility]  [address] [facility]  [phone] fax    Occupational Therapy Discharge Summary  Patient name: Xin Larson. 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 He has HEP. Current AROM  Hand A/PROM left Index Middle Ring Small Thumb     MP           CMC   PIP         60 MP   DIP         65 IP               Sanders Abd             55 Radial Abd      9 hole 26   (was 27)           Pain Level : 0/10 (was 5/10)    1.  Patient will be independent in home exercise program for thumb ROM  Status at Eval:Did not have  Discharge status:.met   2.  Patient will be independent in beak ligament stretch to improve thumb abduction. Status at Eval: Unaware  Discharge Status: Goal Met   3.  Patient will be independent in home program for stabilizing exercises for left thumb, Status at Eval: [address] [facility] [address]  Discharge status:Goal CARMELITA Chiang 51  1.  Patient will improve left thumb radial abduction by at least 10 degrees to allow for improved grasp without pain. Status at eval: 38 degrees  Discharge status: 55 degrees met   2.  Patient will report pain in left hand reduced to 0-2/10 with work and leisure activities.   Status at Eval: Pain 5/10  Discharge status:Met pain 0/10  3.  Patient will be able to perform fasteners with improved ease with left hand due to reduced thumb pain. Status at [facility]  Discharge status:Met    4.  Patient will report improved performance in lifting and carrying tasks as shown by increase in FOTO of at elast 5 points.    Status at [facility]:FOTO 63  Discharge status: met FOTO now 80      ASSESSMENT/Changes in Function:  Excellent improvement in ROM, pain and hand function.       ASSESSMENT/RECOMMENDATIONS:  [x]Discontinue therapy: [x]Patient has reached or is progressing toward set goals      []Patient is non-compliant or has abdicated      []Due to lack of appreciable progress towards set goals    SAMM Ohara/L 10/28/2019 8:24 AM

## 2020-08-05 ENCOUNTER — OFFICE VISIT (OUTPATIENT)
Dept: ORTHOPEDIC SURGERY | Facility: CLINIC | Age: 70
End: 2020-08-05

## 2020-08-05 VITALS
HEIGHT: 67 IN | SYSTOLIC BLOOD PRESSURE: 124 MMHG | HEART RATE: 71 BPM | DIASTOLIC BLOOD PRESSURE: 79 MMHG | WEIGHT: 183.2 LBS | BODY MASS INDEX: 28.75 KG/M2 | TEMPERATURE: 97.3 F

## 2020-08-05 DIAGNOSIS — M18.0 PRIMARY OSTEOARTHRITIS OF BOTH FIRST CARPOMETACARPAL JOINTS: Primary | ICD-10-CM

## 2020-08-05 DIAGNOSIS — M79.641 BILATERAL HAND PAIN: ICD-10-CM

## 2020-08-05 DIAGNOSIS — G56.03 BILATERAL CARPAL TUNNEL SYNDROME: ICD-10-CM

## 2020-08-05 DIAGNOSIS — M79.642 BILATERAL HAND PAIN: ICD-10-CM

## 2020-08-05 RX ORDER — SIMVASTATIN 20 MG/1
TABLET, FILM COATED ORAL
COMMUNITY

## 2020-08-05 RX ORDER — ASPIRIN 81 MG/1
81 TABLET ORAL DAILY
COMMUNITY

## 2020-08-05 RX ORDER — GLUCOSAMINE SULFATE 1500 MG
1000 POWDER IN PACKET (EA) ORAL DAILY
COMMUNITY

## 2020-08-05 RX ORDER — POTASSIUM CHLORIDE 750 MG/1
10 TABLET, FILM COATED, EXTENDED RELEASE ORAL DAILY
COMMUNITY

## 2020-08-05 RX ORDER — OMEPRAZOLE 20 MG/1
20 CAPSULE, DELAYED RELEASE ORAL DAILY
COMMUNITY

## 2020-08-05 RX ORDER — DICYCLOMINE HYDROCHLORIDE 10 MG/1
10 CAPSULE ORAL
COMMUNITY

## 2020-08-05 RX ORDER — CHLORTHALIDONE 25 MG/1
TABLET ORAL DAILY
COMMUNITY

## 2020-08-05 NOTE — PROGRESS NOTES
Pt states he does a lot of yard work and his pain is inhibiting his ability to upkeep. Pt speaks about yard with great interest and passion.

## 2020-08-05 NOTE — PROGRESS NOTES
Sophie Yepez is a 71 y.o. male right handed unknown employment. Worker's Compensation and legal considerations: none filed. Vitals:    08/05/20 0943   BP: 124/79   Pulse: 71   Temp: 97.3 °F (36.3 °C)   Weight: 183 lb 3.2 oz (83.1 kg)   Height: 5' 7\" (1.702 m)   PainSc:   8   PainLoc: Hand           Chief Complaint   Patient presents with    Hand Pain     B/L    Wrist Pain     B/L         HPI: Patient comes in today with complaints of bilateral wrist pain specifically the base of his thumbs as well as bilateral hand numbness. He has a history of bilateral carpal tunnel releases 6 years prior. Date of onset: Chronic    Injury: No    Prior Treatment:  Yes: Comment: Bilateral carpal tunnel releases approximately 2014    Numbness/ Tingling: Yes: Comment: Bilateral hands unspecified digits      ROS: Review of Systems - General ROS: negative  Psychological ROS: negative  ENT ROS: negative  Allergy and Immunology ROS: negative  Hematological and Lymphatic ROS: negative  Respiratory ROS: no cough, shortness of breath, or wheezing  Cardiovascular ROS: no chest pain or dyspnea on exertion  Gastrointestinal ROS: no abdominal pain, change in bowel habits, or black or bloody stools  Musculoskeletal ROS: positive for - pain in thumb - bilateral and wrist - bilateral  Neurological ROS: positive for - numbness/tingling  Dermatological ROS: negative    Past Medical History:   Diagnosis Date    Diabetes ([facility] [address]     Hypertension        Past Surgical History:   Procedure Laterality Date    HX CARPAL TUNNEL RELEASE         Current Outpatient Medications   Medication Sig Dispense Refill    omeprazole (PRILOSEC) 20 mg capsule Take 20 mg by mouth daily.  simvastatin (ZOCOR) 20 mg tablet Take  by mouth nightly.  chlorthalidone (HYGROTEN) 25 mg tablet Take  by mouth daily.  potassium chloride SR (Klor-Con 10) 10 mEq tablet Take  by mouth.       cholecalciferol (Vitamin D3) 25 mcg (1,000 unit) cap Take  by mouth daily.      aspirin delayed-release 81 mg tablet Take  by mouth daily.  dicyclomine (BENTYL) 10 mg capsule Take 10 mg by mouth 4 times daily (before meals and nightly).  triamcinolone acetonide (KENALOG) 10 mg/mL injection 1 mL by Intra artICUlar route once for 1 dose. 1 Vial 0    metFORMIN (GLUCOPHAGE) 500 mg tablet Take 1 Tab by mouth two (2) times daily (with meals). 20 Tab 0    diltiazem hcl 240 mg Tb24 Take 1 Tab by mouth daily.  HYDROcodone-acetaminophen (NORCO) 5-325 mg per tablet Take 1-2 tablets PO every 4-6 hours as needed for pain control. If over the counter ibuprofen or acetaminophen was suggested, then only take the vicodin for pain not well controlled with the over the counter medication. 12 Tab 0    cyclobenzaprine (FLEXERIL) 5 mg tablet Take 1 Tab by mouth three (3) times daily as needed for Muscle Spasm(s). 12 Tab 0    ergocalciferol (VITAMIN D2) 50,000 unit capsule Take 50,000 Units by mouth.  tadalafil (CIALIS) 20 mg tablet Take 20 mg by mouth as needed for 6 doses. 6 Tab 3    azithromycin (ZITHROMAX Z-OMAYRA) 250 mg tablet Take 2 tablets PO day 1, then take 1 tablet PO day 2-5 1 Package 0       No Known Allergies        PE:     Physical Exam  Vitals signs and nursing note reviewed. Constitutional:       General: He is not in acute distress. Appearance: Normal appearance. He is not ill-appearing. Eyes:      Pupils: Pupils are equal, round, and reactive to light. Neck:      Musculoskeletal: Normal range of motion. Cardiovascular:      Pulses: Normal pulses. Pulmonary:      Effort: Pulmonary effort is normal. No respiratory distress. Abdominal:      General: Abdomen is flat. Musculoskeletal: Normal range of motion. General: Tenderness present. No swelling, deformity or signs of injury. Right lower leg: No edema. Left lower leg: No edema. Skin:     General: Skin is warm. Capillary Refill: Capillary refill takes less than 2 seconds. Neurological:      General: No focal deficit present. Mental Status: He is alert and oriented to person, place, and time. Psychiatric:         Mood and Affect: Mood normal.         Behavior: Behavior normal.            Hand:    Examination L Digit(s) R Digit(s)   1st CMC Tenderness +  +    1st CMC Grind +  =    Araseli Nodes -  -    Heberden Nodes -  -    A1 Pulley Tenderness -  -    Triggering -  -    UCL Instability -  -    RCL Instability -  -    Lateral Stress Pain -  -    Palmar Cords -  -    Tabletop test -  -    Garrod's Pads -  -     Strength       Pinch Strength         ROM: Full    NEUROVASCULAR    Examination L R Examination L R   Carpal Comp. ? ? Pronator Comp. - -   Carpal Tinel ? ? Pronator Tinel - -   Phalen's ? ? Pronator Stress - -   Cubital Comp. - - Guyon Comp. - -   Cubital Tinel - - Guyon Tinel - -   Elbow Hyperflexion - - Adson's - -   Spurling's - - SC Comp. - -   PCB Median abn - - SC Tinel - -   Radial Tinel - - IC Comp. - -   Digital Tinel - - IC Tinel - -   Radial 2-Pt WNL WNL Ulnar 2-Pt WNL WNL     Radial Pulse: 2+  Capillary Refill: < 2 sec  Gilbert: Not Performed  Digital Gilbert: Not Performed      Imagin/5/2020 plain films of bilateral hands does not show any fracture or dislocation. There are moderate degenerative changes about the hand with a Eaton stage II at the thumb CMC joints. ICD-10-CM ICD-9-CM    1.  Primary osteoarthritis of both first carpometacarpal joints  M18.0 715.14 AMB SUPPLY ORDER      TRIAMCINOLONE ACETONIDE INJ      triamcinolone acetonide (KENALOG) 10 mg/mL injection      DRAIN/INJECT SMALL JOINT/BURSA   2. Bilateral hand pain  M79.641 729.5 AMB POC XRAY, HAND; 3+ VIEWS    M79.642  AMB POC XRAY, HAND; 3+ VIEWS   3. Bilateral carpal tunnel syndrome  G56.03 354.0 AMB SUPPLY ORDER      EMG TWO EXTREMITIES UPPER      NCV/LAT MOTOR PER NERVE UP/RT      NCV/LAT MOTOR PER NERVE UP/LT         Plan:     Bilateral thumb CMC joint injections    Bilateral cool comfort braces for daytime wear and bilateral carpal tunnel braces for nighttime wear. Bilateral upper extremity EMGs. Follow-up and Dispositions    · Return for EMG review. Plan was reviewed with patient, who verbalized agreement and understanding of the plan    Krissy [address] NOTE        Chart reviewed for the following:   Lew PLASENCIA DO, have reviewed the History, Physical and updated the Allergic reactions for [address] performed immediately prior to start of procedure:   Lew PLASENCIA DO, have performed the following reviews on [address] [place] [address] prior to the start of the procedure:            * Patient was identified by name and date of birth   * Agreement on procedure being performed was verified  * Risks and Benefits explained to the patient  * Procedure site verified and marked as necessary  * Patient was positioned for comfort  * Consent was signed and verified     Time: 10:22 AM      Date of procedure: 8/5/2020    Procedure performed by: Shari Kearns DO    Provider assisted by: Thana Lennox LPN    Patient assisted by: self    How tolerated by patient: tolerated the procedure well with no complications    Post Procedural Pain Scale: 0 - No Hurt    Comments: none    Procedure:  After consent was obtained, using sterile technique the joint was prepped. Local anesthetic used: 1% lidocaine. Kenalog 5 mg X2 and was then injected and the needle withdrawn. The procedure was well tolerated. The patient is asked to continue to rest the area for a few more days before resuming regular activities. It may be more painful for the first 1-2 days. Watch for fever, or increased swelling or persistent pain in the joint. Call or return to clinic prn if such symptoms occur or there is failure to improve as anticipated.

## 2020-08-05 NOTE — PATIENT INSTRUCTIONS
Learning About Arthritis at the [facility] of the Thumb What is it? Arthritis at the base of the thumb joint is wear and tear on the cartilage. Cartilage is a firm, thick, slippery tissue. It covers and protects the ends of bones where they meet to form a joint. When you have arthritis, there are changes in the cartilage that cause it to break down. The bones rub together and cause joint damage and pain. What causes it? Experts don't know what causes arthritis at the base of the thumb. But aging, a lot of use, an injury, or family history may play a part. What are the symptoms? Symptoms of arthritis at the base of the thumb include aching in your joint. Or the pain may feel burning or sharp. You may feel clicking, creaking, or catching in the joint. It may get stiff. You may have more pain and less strength when you pinch or  things. Symptoms may come and go, stay the same, or get worse over time. How is it diagnosed? Your doctor can often diagnose arthritis by asking you questions about your joint pain and other symptoms and examining you. You may also have X-rays and blood tests. Blood tests can help make sure another disease isn't causing your symptoms. How is it treated? Arthritis at the base of your thumb may be treated with rest, pain relievers, steroid medicines, using a brace or splint, andin some casessurgery. To help relieve pain in the joint, rest your sore hand. Switch hands for some activities. You can try heat and cold therapy, such as hot compresses, paraffin wax, cold packs, or ice massage. Your doctor may give you a splint to wear during some activities or when pain flares up. You can often manage mild or moderate arthritis pain with over-the-counter pain relievers. These include medicines that reduce swelling, such as ibuprofen or naproxen. You can also use acetaminophen. Sometimes these medicines are in creams that you can rub on your thumb and hand.  Your doctor may also prescribe other medicine for your pain. For some people, steroid shots may be an option. If none of the treatments work, your doctor may discuss surgery with you. Follow-up care is a key part of your treatment and safety. Be sure to make and go to all appointments, and call your doctor if you are having problems. It's also a good idea to know your test results and keep a list of the medicines you take. Where can you learn more? Go to http://jessica-tori.info/ Enter T110 in the search box to learn more about \"Learning About Arthritis at the [facility] of the Thumb. \" Current as of: December 9, 2019               Content Version: 12.5 © 1207-9971 [employer]. Care instructions adapted under license by [employer] (which disclaims liability or warranty for this information). If you have questions about a medical condition or this instruction, always ask your healthcare professional. [place] [address] any warranty or liability for your use of this information. Carpal Tunnel Syndrome: Care Instructions Overview Carpal tunnel syndrome is numbness, tingling, weakness, and pain in your hand, wrist, and sometimes forearm. It is caused by pressure on the median nerve. This nerve and several tough tissues called tendons run through a space in the wrist. This space is called the carpal tunnel. The repeated hand motions used in work and some hobbies and sports can put pressure on the median nerve. Pregnancy can cause carpal tunnel syndrome. Several conditions, such as diabetes, arthritis, and an underactive thyroid, can also cause it. You may be able to limit an activity or change the way you do it to reduce your symptoms. You also can take other steps to feel better. If your symptoms are mild, 1 to 2 weeks of home treatment are likely to ease your pain. Surgery is needed only if other treatments do not work. Follow-up care is a key part of your treatment and safety. Be sure to make and go to all appointments, and call your doctor if you are having problems. It's also a good idea to know your test results and keep a list of the medicines you take. How can you care for yourself at home? · If possible, stop or reduce the activity that causes your symptoms. If you cannot stop the activity, take frequent breaks to rest and stretch or change hand positions to do a task. Try switching hands, such as when using a computer mouse. · Try to avoid bending or twisting your wrists. · Ask your doctor if you can take an over-the-counter pain medicine, such as acetaminophen (Tylenol), ibuprofen (Advil, Motrin), or naproxen (Aleve). Be safe with medicines. Read and follow all instructions on the label. · If your doctor prescribes corticosteroid medicine to help reduce pain and swelling, take it exactly as prescribed. Call your doctor if you think you are having a problem with your medicine. · Put ice or a cold pack on your wrist for 10 to 20 minutes at a time to ease pain. Put a thin cloth between the ice and your skin. · If your doctor or your physical or occupational therapist tells you to wear a wrist splint, wear it as directed to keep your wrist in a neutral position. This also eases pressure on your median nerve. · Ask your doctor whether you should have physical or occupational therapy to learn how to do tasks differently. · Try a yoga class to stretch your muscles and build strength in your hands and wrists. Yoga has been shown to ease carpal tunnel symptoms. To prevent carpal tunnel · When working at a computer, keep your hands and wrists in line with your forearms. Hold your elbows close to your sides. Take a break every 10 to 15 minutes. · Try these exercises: 
? Warm up: Rotate your wrist up, down, and from side to side. Repeat this 4 times.  Stretch your fingers far apart, relax them, then stretch them again. Repeat 4 times. Stretch your thumb by pulling it back gently, holding it, and then releasing it. Repeat 4 times. ? Prayer stretch: Start with your palms together in front of your chest just below your chin. Slowly lower your hands toward your waistline while keeping your hands close to your stomach and your palms together until you feel a mild to moderate stretch under your forearms. Hold for 10 to 20 seconds. Repeat 4 times. ? Wrist flexor stretch: Hold your arm in front of you with your palm up. Bend your wrist, pointing your hand toward the floor. With your other hand, gently bend your wrist further until you feel a mild to moderate stretch in your forearm. Hold for 10 to 20 seconds. Repeat 4 times. ? Wrist extensor stretch: Repeat the steps for the wrist flexor stretch, but begin with your extended hand palm down. · Squeeze a rubber exercise ball several times a day to keep your hands and fingers strong. · Avoid holding objects (such as a book) in one position for a long time. When possible, use your whole hand to grasp an object. Using just the thumb and index finger can put stress on the wrist. 
· Do not smoke. It can make this condition worse by reducing blood flow to the median nerve. If you need help quitting, talk to your doctor about stop-smoking programs and medicines. These can increase your chances of quitting for good. When should you call for help? Watch closely for changes in your health, and be sure to contact your doctor if: 
· Your pain or other problems do not get better with home care. · You want more information about physical or occupational therapy. · You have side effects of your corticosteroid medicine, such as: 
? Weight gain. ? Mood changes. ? Trouble sleeping. ? Bruising easily. · You have any other problems with your medicine. Where can you learn more? Go to http://jessica-tori.info/ Enter R432 in the search box to learn more about \"Carpal Tunnel Syndrome: Care Instructions. \" Current as of: March 2, 2020               Content Version: 12.5 © 0430-0510 Healthwise, Incorporated. Care instructions adapted under license by [employer] (which disclaims liability or warranty for this information). If you have questions about a medical condition or this instruction, always ask your healthcare professional. [place] [address] any warranty or liability for your use of this information.

## 2020-09-16 ENCOUNTER — OFFICE VISIT (OUTPATIENT)
Dept: ORTHOPEDIC SURGERY | Age: 70
End: 2020-09-16

## 2020-09-16 VITALS
OXYGEN SATURATION: 99 % | DIASTOLIC BLOOD PRESSURE: 82 MMHG | WEIGHT: 179 LBS | TEMPERATURE: 98.7 F | SYSTOLIC BLOOD PRESSURE: 141 MMHG | BODY MASS INDEX: 28.77 KG/M2 | HEART RATE: 85 BPM | RESPIRATION RATE: 24 BRPM | HEIGHT: 66 IN

## 2020-09-16 DIAGNOSIS — R20.0 NUMBNESS AND TINGLING IN BOTH HANDS: Primary | ICD-10-CM

## 2020-09-16 DIAGNOSIS — R94.131 ABNORMAL EMG: ICD-10-CM

## 2020-09-16 DIAGNOSIS — R20.2 NUMBNESS AND TINGLING IN BOTH HANDS: Primary | ICD-10-CM

## 2020-09-16 NOTE — PROGRESS NOTES
Phillip Avalos presents today for   Chief Complaint   Patient presents with    Hand Pain     bilateral    Arm Pain       Is someone accompanying this pt? no    Is the patient using any DME equipment during OV? no      Coordination of Care:  1. Have you been to the ER, urgent care clinic since your last visit? no  Hospitalized since your last visit? no    2. Have you seen or consulted any other health care providers outside of the [address] [place] [address] since your last visit? Yes, orthopedics Include any pap smears or colon screening.  no

## 2020-09-16 NOTE — PROGRESS NOTES
[facility] [address]  [address], [address]  [address]  Phone: [phone]  Fax: [phone]        Uriel Vang  : 1950  PCP: Antwan Mae MD  2020    ELECTROMYOGRAPHY AND NERVE CONDUCTION STUDIES    Kiran Ricks was referred by Dr. Joshua Fletcher for electrodiagnostic evaluation of bilateral hand numbness and tingling. NCV & EMG Findings:  Evaluation of the left ulnar motor and the right ulnar motor nerves showed decreased conduction velocity (A Elbow-B Elbow, L36, R43 m/s). All remaining nerves (as indicated in the following tables) were within normal limits. All left vs. right side differences were within normal limits. All examined muscles (as indicated in the following table) showed no evidence of electrical instability. INTERPRETATION  This is an abnormal electrodiagnostic examination. These findings may be consistent with:   1. 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 CLINICAL INTERPRETATION  His electrodiagnostic findings of bilateral cubital tunnel syndrome may explain some of his bilateral hand symptoms. He does not appear to electrodiagnostically show CTS at this time. HISTORY OF PRESENT ILLNESS  Jeffrey Ricks is a 71 y.o. male. Pt presents today for BUE EMG evaluation of bilateral hand numbness and tingling. He has history of bilateral CTS release 6 years ago by Dr. Cristiano Casiano. PAST MEDICAL HISTORY   Past Medical History:   Diagnosis Date    Diabetes ([facility] [address]     Hypertension        Past Surgical History:   Procedure Laterality Date    HX CARPAL TUNNEL RELEASE     . MEDICATIONS    Current Outpatient Medications   Medication Sig Dispense Refill    simvastatin (ZOCOR) 20 mg tablet Take  by mouth nightly.       chlorthalidone (HYGROTEN) 25 mg tablet Take  by mouth daily.  potassium chloride SR (Klor-Con 10) 10 mEq tablet Take 10 mEq by mouth daily.  cholecalciferol (Vitamin D3) 25 mcg (1,000 unit) cap Take 1,000 Units by mouth daily.  aspirin delayed-release 81 mg tablet Take 81 mg by mouth daily.  dicyclomine (BENTYL) 10 mg capsule Take 10 mg by mouth 4 times daily (before meals and nightly).  metFORMIN (GLUCOPHAGE) 500 mg tablet Take 1 Tab by mouth two (2) times daily (with meals). 20 Tab 0    diltiazem hcl 240 mg Tb24 Take 1 Tab by mouth daily.  omeprazole (PRILOSEC) 20 mg capsule Take 20 mg by mouth daily.  HYDROcodone-acetaminophen (NORCO) 5-325 mg per tablet Take 1-2 tablets PO every 4-6 hours as needed for pain control. If over the counter ibuprofen or acetaminophen was suggested, then only take the vicodin for pain not well controlled with the over the counter medication. 12 Tab 0    cyclobenzaprine (FLEXERIL) 5 mg tablet Take 1 Tab by mouth three (3) times daily as needed for Muscle Spasm(s). 12 Tab 0    ergocalciferol (VITAMIN D2) 50,000 unit capsule Take 50,000 Units by mouth every seven (7) days.  tadalafil (CIALIS) 20 mg tablet Take 20 mg by mouth as needed for 6 doses. 6 Tab 3        ALLERGIES  No Known Allergies       SOCIAL HISTORY    Social History     Socioeconomic History    Marital status:      Spouse name: Not on file    Number of children: Not on file    Years of education: Not on file    Highest education level: Not on file   Tobacco Use    Smoking status: Former Smoker    Smokeless tobacco: Never Used   Substance and Sexual Activity    Alcohol use: No    Drug use: No       FAMILY HISTORY  No family history on file.       PHYSICAL EXAMINATION  Visit Vitals  BP (!) 141/82 (BP 1 Location: Left arm, BP Patient Position: Sitting) Comment: pt asymptomatic, MD aware   Pulse 85   Temp 98.7 °F (37.1 °C) (Skin)   Resp 24   Ht 5' 6\" (1.676 m)   Wt 179 lb (81.2 kg)   SpO2 99% Comment: RA   BMI 28.89 kg/m²       Pain Assessment  9/16/2020   Location of Pain Arm;Hand   Location Modifiers Right;Left   Severity of Pain 5   Quality of Pain Burning; Other (Comment)   Quality of Pain Comment numbness/tingling to hands   Duration of Pain Persistent   Frequency of Pain Constant   Aggravating Factors Other (Comment)   Aggravating Factors Comment unknown   Limiting Behavior Yes   Relieving Factors Other (Comment)   Relieving Factors Comment shots in hands, wearing braces, hold hands still   Result of Injury No           Constitutional:  Well developed, well nourished, in no acute distress. Psychiatric: Affect and mood are appropriate. Integumentary: No rashes or abrasions noted on exposed areas. SPINE/MUSCULOSKELETAL EXAM    On brief examination: None.       NCV & EMG Findings:  Nerve Conduction Studies  Anti Sensory Summary Table     Stim Site NR Peak (ms) Norm Peak (ms) O-P Amp (µV) Norm O-P Amp Site1 Site2 Delta-P (ms) Dist (cm) Angelo (m/s) Norm Angelo (m/s)   Left Median Anti Sensory (2nd Digit)   Wrist    3.3 <3.6 27.2 >10 Wrist 2nd Digit 3.3 14.0 42 >39   Right Median Anti Sensory (2nd Digit)   Wrist    3.3 <3.6 27.1 >10 Wrist 2nd Digit 3.3 14.0 42 >39   Left Radial Anti Sensory (Base 1st Digit)   Wrist    2.2 <3.1 24.4  Wrist Base 1st Digit 2.2 0.0     Right Radial Anti Sensory (Base 1st Digit)   Wrist    2.2 <3.1 37.3  Wrist Base 1st Digit 2.2 0.0     Left Ulnar Anti Sensory (5th Digit)   Wrist    3.2 <3.7 23.3 >15.0 Wrist 5th Digit 3.2 14.0 44 >38   Right Ulnar Anti Sensory (5th Digit)   Wrist    3.3 <3.7 26.7 >15.0 Wrist 5th Digit 3.3 14.0 42 >38     Motor Summary Table     Stim Site NR Onset (ms) Norm Onset (ms) O-P Amp (mV) Norm O-P Amp Site1 Site2 Delta-0 (ms) Dist (cm) Angelo (m/s) Norm Angelo (m/s)   Left Median Motor (Abd Poll Brev)   Wrist    4.0 <4.2 7.4 >5 Elbow Wrist 4.2 23.0 55 >50   Elbow    8.2  7.5          Right Median Motor (Abd Poll Brev)   Wrist 4. 2 <4.2 9.3 >5 Elbow Wrist 4.2 22.0 52 >50   Elbow    8.4  8.3          Left Ulnar Motor (Abd Dig Min)   Wrist    3.0 <4.2 10.4 >3 B Elbow Wrist 4.1 22.0 54 >53   B Elbow    7.1  9.0  A Elbow B Elbow 2.8 10.0 36 >53   A Elbow    9.9  9.3          Right Ulnar Motor (Abd Dig Min)   Wrist    3.0 <4.2 10.9 >3 B Elbow Wrist 3.7 21.0 57 >53   B Elbow    6.7  10.7  A Elbow B Elbow 2.3 10.0 43 >53   A Elbow    9.0  10.6            EMG     Side Muscle Nerve Root Ins Act Fibs Psw Amp Dur Poly Recrt Int Lorenda Mian Comment   Left Biceps Musculocut C5-6 Nml Nml Nml Nml Nml 0 Nml Nml    Left Triceps Radial C6-7-8 Nml Nml Nml Nml Nml 0 Nml Nml    Left PronatorTeres Median C6-7 Nml Nml Nml Nml Nml 0 Nml Nml    Left Abd Poll Brev Median C8-T1 Nml Nml Nml Nml Nml 0 Nml Nml    Left 1stDorInt Ulnar C8-T1 Nml Nml Nml Nml Nml 0 Nml Nml    Right Biceps Musculocut C5-6 Nml Nml Nml Nml Nml 0 Nml Nml    Right Triceps Radial C6-7-8 Nml Nml Nml Nml Nml 0 Nml Nml    Right PronatorTeres Median C6-7 Nml Nml Nml Nml Nml 0 Nml Nml    Right Abd Poll Brev Median C8-T1 Nml Nml Nml Nml Nml 0 Nml Nml    Right 1stDorInt Ulnar C8-T1 Nml Nml Nml Nml Nml 0 Nml Nml        Nerve Conduction Studies  Anti Sensory Left/Right Comparison     Stim Site L Lat (ms) R Lat (ms) L-R Lat (ms) L Amp (µV) R Amp (µV) L-R Amp (%) Site1 Site2 L Angelo (m/s) R Angelo (m/s) L-R Angelo (m/s)   Median Anti Sensory (2nd Digit)   Wrist 3.3 3.3 0.0 27.2 27.1 0.4 Wrist 2nd Digit 42 42 0   Radial Anti Sensory (Base 1st Digit)   Wrist 2.2 2.2 0.0 24.4 37.3 34.6 Wrist Base 1st Digit      Ulnar Anti Sensory (5th Digit)   Wrist 3.2 3.3 0.1 23.3 26.7 12.7 Wrist 5th Digit 44 42 2     Motor Left/Right Comparison     Stim Site L Lat (ms) R Lat (ms) L-R Lat (ms) L Amp (mV) R Amp (mV) L-R Amp (%) Site1 Site2 L Angelo (m/s) R Angelo (m/s) L-R Angelo (m/s)   Median Motor (Abd Poll Brev)   Wrist 4.0 4.2 0.2 7.4 9.3 20.4 Elbow Wrist 55 52 3   Elbow 8.2 8.4 0.2 7.5 8.3 9.6        Ulnar Motor (Abd Dig Min)   Wrist 3.0 3.0 0. 0 10.4 10.9 4.6 B Elbow Wrist 54 57 3   B Elbow 7.1 6.7 0.4 9.0 10.7 15.9 A Elbow B Elbow 36 43 7   A Elbow 9.9 9.0 0.9 9.3 10.6 12.3              Waveforms:                                  [facility]  OFFICE PROCEDURE PROGRESS NOTE        Chart reviewed for the following:   Carmita PLASENCIA, have reviewed the History, Physical and updated the Allergic reactions for [address] performed immediately prior to start of procedure:   Carmita PLASENCIA, have performed the following reviews on [address] [place] [address] prior to the start of the procedure:            * Patient was identified by name and date of birth   * Agreement on procedure being performed was verified  * Risks and Benefits explained to the patient  * Procedure site verified and marked as necessary  * Patient was positioned for comfort  * Consent was signed and verified     Time: 10:53 AM    Date of procedure: 9/16/2020    Procedure performed by:  Marilee Mckee MD    Provider accompanied by: Scribe. Patient accompanied by: Self.     How tolerated by patient: tolerated the procedure well with no complications    Post Procedural Pain Scale: 0 - No Hurt    Comments: none    Written by Alfonso Bright as dictated by Carmita Cueva MD

## 2020-09-16 NOTE — LETTER
9/16/20 Patient: Luis Eduardo Ortega YOB: 1950 Date of Visit: 9/16/2020 Parvez Van, [address] [facility] [address] 
[address] [facility] [address] VIA Facsimile: [phone] China Kwong, [address] [address] [place] [address] VIA In Basket Dear MD China Silva, DO, Thank you for referring Mr. Pamella Schafer to [facility] [facility] for evaluation. My notes for this consultation are attached. If you have questions, please do not hesitate to call me. I look forward to following your patient along with you.  
 
 
Sincerely, 
 
Savannah Blancas MD

## 2020-09-23 ENCOUNTER — OFFICE VISIT (OUTPATIENT)
Dept: ORTHOPEDIC SURGERY | Age: 70
End: 2020-09-23
Payer: MEDICARE

## 2020-09-23 VITALS
WEIGHT: 178 LBS | HEIGHT: 66 IN | RESPIRATION RATE: 15 BRPM | SYSTOLIC BLOOD PRESSURE: 144 MMHG | OXYGEN SATURATION: 100 % | DIASTOLIC BLOOD PRESSURE: 76 MMHG | TEMPERATURE: 97.3 F | BODY MASS INDEX: 28.61 KG/M2 | HEART RATE: 71 BPM

## 2020-09-23 DIAGNOSIS — G56.23 CUBITAL TUNNEL SYNDROME, BILATERAL: Primary | ICD-10-CM

## 2020-09-23 PROCEDURE — G8510 SCR DEP NEG, NO PLAN REQD: HCPCS | Performed by: ORTHOPAEDIC SURGERY

## 2020-09-23 PROCEDURE — G8419 CALC BMI OUT NRM PARAM NOF/U: HCPCS | Performed by: ORTHOPAEDIC SURGERY

## 2020-09-23 PROCEDURE — G8427 DOCREV CUR MEDS BY ELIG CLIN: HCPCS | Performed by: ORTHOPAEDIC SURGERY

## 2020-09-23 PROCEDURE — G8536 NO DOC ELDER MAL SCRN: HCPCS | Performed by: ORTHOPAEDIC SURGERY

## 2020-09-23 PROCEDURE — 1101F PT FALLS ASSESS-DOCD LE1/YR: CPT | Performed by: ORTHOPAEDIC SURGERY

## 2020-09-23 PROCEDURE — 3017F COLORECTAL CA SCREEN DOC REV: CPT | Performed by: ORTHOPAEDIC SURGERY

## 2020-09-23 PROCEDURE — 99214 OFFICE O/P EST MOD 30 MIN: CPT | Performed by: ORTHOPAEDIC SURGERY

## 2020-09-23 NOTE — PROGRESS NOTES
Joseph Ulrich is a 71 y.o. male right handed unknown employment. Worker's Compensation and legal considerations: none filed. Vitals:    09/23/20 1016   BP: (!) 144/76   Pulse: 71   Resp: 15   Temp: 97.3 °F (36.3 °C)   TempSrc: Temporal   SpO2: 100%   Weight: 178 lb (80.7 kg)   Height: 5' 6\" (1.676 m)   PainSc:   2   PainLoc: Wrist           Chief Complaint   Patient presents with    Wrist Pain     nelly wrist pain       HPI: Patient comes in today for bilateral upper extremity EMG follow-up. He also received bilateral thumb CMC injections and braces at his last visit. He reports near 100% improvement in his arthritis symptoms. Initial HPI: Patient comes in today with complaints of bilateral wrist pain specifically the base of his thumbs as well as bilateral hand numbness. He has a history of bilateral carpal tunnel releases 6 years prior. Date of onset: Chronic    Injury: No    Prior Treatment:  Yes: Comment: Bilateral CMC injections and braces    Numbness/ Tingling: Yes: Comment: Bilateral hands unspecified digits      ROS: Review of Systems - General ROS: negative  Psychological ROS: negative  ENT ROS: negative  Allergy and Immunology ROS: negative  Hematological and Lymphatic ROS: negative  Respiratory ROS: no cough, shortness of breath, or wheezing  Cardiovascular ROS: no chest pain or dyspnea on exertion  Gastrointestinal ROS: no abdominal pain, change in bowel habits, or black or bloody stools  Musculoskeletal ROS: positive for - pain in thumb - bilateral and wrist - bilateral  Neurological ROS: positive for - numbness/tingling  Dermatological ROS: negative    Past Medical History:   Diagnosis Date    Diabetes ([facility] [address]     Hypertension        Past Surgical History:   Procedure Laterality Date    HX CARPAL TUNNEL RELEASE         Current Outpatient Medications   Medication Sig Dispense Refill    omeprazole (PRILOSEC) 20 mg capsule Take 20 mg by mouth daily.       simvastatin (ZOCOR) 20 mg tablet Take  by mouth nightly.  chlorthalidone (HYGROTEN) 25 mg tablet Take  by mouth daily.  potassium chloride SR (Klor-Con 10) 10 mEq tablet Take 10 mEq by mouth daily.  cholecalciferol (Vitamin D3) 25 mcg (1,000 unit) cap Take 1,000 Units by mouth daily.  aspirin delayed-release 81 mg tablet Take 81 mg by mouth daily.  dicyclomine (BENTYL) 10 mg capsule Take 10 mg by mouth 4 times daily (before meals and nightly).  metFORMIN (GLUCOPHAGE) 500 mg tablet Take 1 Tab by mouth two (2) times daily (with meals). 20 Tab 0    ergocalciferol (VITAMIN D2) 50,000 unit capsule Take 50,000 Units by mouth every seven (7) days.  diltiazem hcl 240 mg Tb24 Take 1 Tab by mouth daily.  HYDROcodone-acetaminophen (NORCO) 5-325 mg per tablet Take 1-2 tablets PO every 4-6 hours as needed for pain control. If over the counter ibuprofen or acetaminophen was suggested, then only take the vicodin for pain not well controlled with the over the counter medication. 12 Tab 0    cyclobenzaprine (FLEXERIL) 5 mg tablet Take 1 Tab by mouth three (3) times daily as needed for Muscle Spasm(s). 12 Tab 0    tadalafil (CIALIS) 20 mg tablet Take 20 mg by mouth as needed for 6 doses. 6 Tab 3       No Known Allergies        PE:     Physical Exam  Vitals signs and nursing note reviewed. Constitutional:       General: He is not in acute distress. Appearance: Normal appearance. He is not ill-appearing. Eyes:      Pupils: Pupils are equal, round, and reactive to light. Neck:      Musculoskeletal: Normal range of motion. Cardiovascular:      Pulses: Normal pulses. Pulmonary:      Effort: Pulmonary effort is normal. No respiratory distress. Abdominal:      General: Abdomen is flat. Musculoskeletal: Normal range of motion. General: Tenderness present. No swelling, deformity or signs of injury. Right lower leg: No edema. Left lower leg: No edema. Skin:     General: Skin is warm. Capillary Refill: Capillary refill takes less than 2 seconds. Neurological:      General: No focal deficit present. Mental Status: He is alert and oriented to person, place, and time. Psychiatric:         Mood and Affect: Mood normal.         Behavior: Behavior normal.            Hand:    Examination L Digit(s) R Digit(s)   1st CMC Tenderness -  -    1st CMC Grind -  -    Araseli Nodes -  -    Heberden Nodes -  -    A1 Pulley Tenderness -  -    Triggering -  -    UCL Instability -  -    RCL Instability -  -    Lateral Stress Pain -  -    Palmar Cords -  -    Tabletop test -  -    Garrod's Pads -  -     Strength       Pinch Strength         ROM: Full    NEUROVASCULAR    Examination L R Examination L R   Carpal Comp. - - Pronator Comp. - -   Carpal Tinel - - Pronator Tinel - -   Phalen's - - Pronator Stress - -   Cubital Comp. - - Guyon Comp. - -   Cubital Tinel - - Guyon Tinel - -   Elbow Hyperflexion - - Adson's - -   Spurling's - - SC Comp. - -   PCB Median abn - - SC Tinel - -   Radial Tinel - - IC Comp. - -   Digital Tinel - - IC Tinel - -   Radial 2-Pt WNL WNL Ulnar 2-Pt WNL WNL     Radial Pulse: 2+  Capillary Refill: < 2 sec  [place]: Not Performed  [employer]: Not Performed      NCV & EMG Findings:  Evaluation of the left ulnar motor and the right ulnar motor nerves showed decreased conduction velocity (A Elbow-B Elbow, L36, R43 m/s). All remaining nerves (as indicated in the following tables) were within normal limits. All left vs. right side differences were within normal limits.       All examined muscles (as indicated in the following table) showed no evidence of electrical instability.       INTERPRETATION  This is an abnormal electrodiagnostic examination. These findings may be consistent with:   1.  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        CLINICAL INTERPRETATION  His electrodiagnostic findings of bilateral cubital tunnel syndrome may explain some of his bilateral hand symptoms. He does not appear to electrodiagnostically show CTS at this time. Imagin/5/2020 plain films of bilateral hands does not show any fracture or dislocation. There are moderate degenerative changes about the hand with a Eaton stage II at the thumb CMC joints. ICD-10-CM ICD-9-CM    1. Cubital tunnel syndrome, bilateral  G56.23 354.2          Plan:     Continue cool comfort braces. I have instructed the patient on not keeping his elbows bent for too long a period of time. Follow-up and Dispositions    · Return if symptoms worsen or fail to improve.           Plan was reviewed with patient, who verbalized agreement and understanding of the plan

## 2021-01-08 ENCOUNTER — APPOINTMENT (OUTPATIENT)
Dept: GENERAL RADIOLOGY | Age: 71
DRG: 310 | End: 2021-01-08
Attending: EMERGENCY MEDICINE
Payer: MEDICARE

## 2021-01-08 ENCOUNTER — HOSPITAL ENCOUNTER (INPATIENT)
Age: 71
LOS: 2 days | Discharge: HOME | DRG: 310 | End: 2021-01-10
Attending: EMERGENCY MEDICINE | Admitting: HOSPITALIST
Payer: MEDICARE

## 2021-01-08 DIAGNOSIS — I47.1 SVT (SUPRAVENTRICULAR TACHYCARDIA) (HCC): Primary | ICD-10-CM

## 2021-01-08 LAB
ALBUMIN SERPL-MCNC: 4.1 G/DL (ref 3.4–5)
ALBUMIN/GLOB SERPL: 0.9 {RATIO} (ref 0.8–1.7)
ALP SERPL-CCNC: 87 U/L (ref 45–117)
ALT SERPL-CCNC: 71 U/L (ref 16–61)
ANION GAP SERPL CALC-SCNC: 11 MMOL/L (ref 3–18)
APPEARANCE UR: CLEAR
AST SERPL-CCNC: 25 U/L (ref 10–38)
ATRIAL RATE: 118 BPM
BASOPHILS # BLD: 0 K/UL (ref 0–0.06)
BASOPHILS NFR BLD: 0 % (ref 0–3)
BILIRUB SERPL-MCNC: 0.8 MG/DL (ref 0.2–1)
BILIRUB UR QL: NEGATIVE
BNP SERPL-MCNC: 10 PG/ML (ref 0–900)
BUN SERPL-MCNC: 17 MG/DL (ref 7–18)
BUN/CREAT SERPL: 13 (ref 12–20)
CALCIUM SERPL-MCNC: 9.4 MG/DL (ref 8.5–10.1)
CALCULATED P AXIS, ECG09: 61 DEGREES
CALCULATED R AXIS, ECG10: 67 DEGREES
CALCULATED T AXIS, ECG11: 58 DEGREES
CHLORIDE SERPL-SCNC: 101 MMOL/L (ref 100–111)
CK MB CFR SERPL CALC: NORMAL % (ref 0–4)
CK MB SERPL-MCNC: <1 NG/ML (ref 5–25)
CK SERPL-CCNC: 41 U/L (ref 39–308)
CO2 SERPL-SCNC: 27 MMOL/L (ref 21–32)
COLOR UR: YELLOW
CREAT SERPL-MCNC: 1.31 MG/DL (ref 0.6–1.3)
DIAGNOSIS, 93000: NORMAL
DIFFERENTIAL METHOD BLD: ABNORMAL
EOSINOPHIL # BLD: 0.4 K/UL (ref 0–0.4)
EOSINOPHIL NFR BLD: 4 % (ref 0–5)
ERYTHROCYTE [DISTWIDTH] IN BLOOD BY AUTOMATED COUNT: 13.6 % (ref 11.6–14.5)
GLOBULIN SER CALC-MCNC: 4.7 G/DL (ref 2–4)
GLUCOSE SERPL-MCNC: 162 MG/DL (ref 74–99)
GLUCOSE UR STRIP.AUTO-MCNC: NEGATIVE MG/DL
HCT VFR BLD AUTO: 42.2 % (ref 36–48)
HGB BLD-MCNC: 14.7 G/DL (ref 13–16)
HGB UR QL STRIP: NEGATIVE
KETONES UR QL STRIP.AUTO: NEGATIVE MG/DL
LEUKOCYTE ESTERASE UR QL STRIP.AUTO: NEGATIVE
LYMPHOCYTES # BLD: 4.4 K/UL (ref 0.8–3.5)
LYMPHOCYTES NFR BLD: 51 % (ref 20–51)
MAGNESIUM SERPL-MCNC: 2.2 MG/DL (ref 1.6–2.6)
MCH RBC QN AUTO: 28.5 PG (ref 24–34)
MCHC RBC AUTO-ENTMCNC: 34.8 G/DL (ref 31–37)
MCV RBC AUTO: 81.9 FL (ref 74–97)
MONOCYTES # BLD: 1.4 K/UL (ref 0–1)
MONOCYTES NFR BLD: 16 % (ref 2–9)
NEUTS SEG # BLD: 2.6 K/UL (ref 1.8–8)
NEUTS SEG NFR BLD: 29 % (ref 42–75)
NITRITE UR QL STRIP.AUTO: NEGATIVE
P-R INTERVAL, ECG05: 172 MS
PH UR STRIP: 8 [PH] (ref 5–8)
PLATELET # BLD AUTO: 295 K/UL (ref 135–420)
PLATELET COMMENTS,PCOM: ABNORMAL
PMV BLD AUTO: 9.7 FL (ref 9.2–11.8)
POTASSIUM SERPL-SCNC: 3.6 MMOL/L (ref 3.5–5.5)
PROT SERPL-MCNC: 8.8 G/DL (ref 6.4–8.2)
PROT UR STRIP-MCNC: NEGATIVE MG/DL
Q-T INTERVAL, ECG07: 318 MS
QRS DURATION, ECG06: 92 MS
QTC CALCULATION (BEZET), ECG08: 445 MS
RBC # BLD AUTO: 5.15 M/UL (ref 4.7–5.5)
RBC MORPH BLD: ABNORMAL
SODIUM SERPL-SCNC: 139 MMOL/L (ref 136–145)
SP GR UR REFRACTOMETRY: 1.01 (ref 1–1.03)
TROPONIN I SERPL-MCNC: <0.02 NG/ML (ref 0–0.04)
UROBILINOGEN UR QL STRIP.AUTO: 0.2 EU/DL (ref 0.2–1)
VENTRICULAR RATE, ECG03: 118 BPM
WBC # BLD AUTO: 8.8 K/UL (ref 4.6–13.2)

## 2021-01-08 PROCEDURE — 71045 X-RAY EXAM CHEST 1 VIEW: CPT

## 2021-01-08 PROCEDURE — 96374 THER/PROPH/DIAG INJ IV PUSH: CPT

## 2021-01-08 PROCEDURE — 99223 1ST HOSP IP/OBS HIGH 75: CPT | Performed by: INTERNAL MEDICINE

## 2021-01-08 PROCEDURE — 65660000000 HC RM CCU STEPDOWN

## 2021-01-08 PROCEDURE — 80053 COMPREHEN METABOLIC PANEL: CPT

## 2021-01-08 PROCEDURE — 83880 ASSAY OF NATRIURETIC PEPTIDE: CPT

## 2021-01-08 PROCEDURE — 96376 TX/PRO/DX INJ SAME DRUG ADON: CPT

## 2021-01-08 PROCEDURE — 81003 URINALYSIS AUTO W/O SCOPE: CPT

## 2021-01-08 PROCEDURE — 74011000250 HC RX REV CODE- 250: Performed by: EMERGENCY MEDICINE

## 2021-01-08 PROCEDURE — 99223 1ST HOSP IP/OBS HIGH 75: CPT | Performed by: HOSPITALIST

## 2021-01-08 PROCEDURE — 93005 ELECTROCARDIOGRAM TRACING: CPT

## 2021-01-08 PROCEDURE — 74011250636 HC RX REV CODE- 250/636: Performed by: EMERGENCY MEDICINE

## 2021-01-08 PROCEDURE — 85025 COMPLETE CBC W/AUTO DIFF WBC: CPT

## 2021-01-08 PROCEDURE — 99284 EMERGENCY DEPT VISIT MOD MDM: CPT

## 2021-01-08 PROCEDURE — 82553 CREATINE MB FRACTION: CPT

## 2021-01-08 PROCEDURE — 94762 N-INVAS EAR/PLS OXIMTRY CONT: CPT

## 2021-01-08 PROCEDURE — 83735 ASSAY OF MAGNESIUM: CPT

## 2021-01-08 RX ORDER — SODIUM CHLORIDE 0.9 % (FLUSH) 0.9 %
5-40 SYRINGE (ML) INJECTION EVERY 8 HOURS
Status: DISCONTINUED | OUTPATIENT
Start: 2021-01-08 | End: 2021-01-10 | Stop reason: HOSPADM

## 2021-01-08 RX ORDER — METOPROLOL TARTRATE 25 MG/1
25 TABLET, FILM COATED ORAL EVERY 12 HOURS
Status: DISCONTINUED | OUTPATIENT
Start: 2021-01-08 | End: 2021-01-10 | Stop reason: HOSPADM

## 2021-01-08 RX ORDER — SODIUM CHLORIDE 0.9 % (FLUSH) 0.9 %
5-40 SYRINGE (ML) INJECTION AS NEEDED
Status: DISCONTINUED | OUTPATIENT
Start: 2021-01-08 | End: 2021-01-10 | Stop reason: HOSPADM

## 2021-01-08 RX ORDER — GUAIFENESIN 100 MG/5ML
81 LIQUID (ML) ORAL DAILY
Status: DISCONTINUED | OUTPATIENT
Start: 2021-01-09 | End: 2021-01-10 | Stop reason: HOSPADM

## 2021-01-08 RX ORDER — ONDANSETRON 2 MG/ML
4 INJECTION INTRAMUSCULAR; INTRAVENOUS
Status: DISCONTINUED | OUTPATIENT
Start: 2021-01-08 | End: 2021-01-10 | Stop reason: HOSPADM

## 2021-01-08 RX ORDER — ACETAMINOPHEN 650 MG/1
650 SUPPOSITORY RECTAL
Status: DISCONTINUED | OUTPATIENT
Start: 2021-01-08 | End: 2021-01-10 | Stop reason: HOSPADM

## 2021-01-08 RX ORDER — METOPROLOL TARTRATE 5 MG/5ML
5 INJECTION INTRAVENOUS ONCE
Status: COMPLETED | OUTPATIENT
Start: 2021-01-08 | End: 2021-01-08

## 2021-01-08 RX ORDER — ACETAMINOPHEN 325 MG/1
650 TABLET ORAL
Status: DISCONTINUED | OUTPATIENT
Start: 2021-01-08 | End: 2021-01-10 | Stop reason: HOSPADM

## 2021-01-08 RX ORDER — POLYETHYLENE GLYCOL 3350 17 G/17G
17 POWDER, FOR SOLUTION ORAL DAILY PRN
Status: DISCONTINUED | OUTPATIENT
Start: 2021-01-08 | End: 2021-01-10 | Stop reason: HOSPADM

## 2021-01-08 RX ORDER — ENOXAPARIN SODIUM 100 MG/ML
40 INJECTION SUBCUTANEOUS DAILY
Status: DISCONTINUED | OUTPATIENT
Start: 2021-01-09 | End: 2021-01-10 | Stop reason: HOSPADM

## 2021-01-08 RX ORDER — ONDANSETRON 4 MG/1
4 TABLET, ORALLY DISINTEGRATING ORAL
Status: DISCONTINUED | OUTPATIENT
Start: 2021-01-08 | End: 2021-01-10 | Stop reason: HOSPADM

## 2021-01-08 RX ADMIN — SODIUM CHLORIDE 1000 ML: 900 INJECTION, SOLUTION INTRAVENOUS at 14:42

## 2021-01-08 RX ADMIN — METOPROLOL TARTRATE 5 MG: 5 INJECTION, SOLUTION INTRAVENOUS at 15:53

## 2021-01-08 RX ADMIN — METOPROLOL TARTRATE 5 MG: 5 INJECTION, SOLUTION INTRAVENOUS at 14:39

## 2021-01-08 NOTE — ED PROVIDER NOTES
EMERGENCY DEPARTMENT HISTORY AND PHYSICAL EXAM    1:37 PM  Date: 1/8/2021  Patient Name: Yosi Rodríguez    History of Presenting Illness     Chief Complaint   Patient presents with    Rapid Heart Rate       History Provided By: patient     HPI: Yosi Rodríguez is a 79 y.o. male with past medical history of diabetes, hypertension, angina presents with an episode of palpitations. He said that his chest felt funny and that he felt as if he has been running and he had to sit down to catch his breath. According to paramedics patient was in SVT and received 6 mg of adenosine and reverted to sinus rhythm. Patient denies any fever, chills, chest pain, shortness of breath. Patient has no abdominal pain or symptoms. No fever or chills. States that he has been feeling some chest pressure intermittently. Had a cardiology appointment the last few years         PCP: Loreto Alford MD    Past History     Past Medical History:  Past Medical History:   Diagnosis Date    Diabetes ([address]     Hypertension        Past Surgical History:  Past Surgical History:   Procedure Laterality Date    HX CARPAL TUNNEL RELEASE         Family History:  No family history on file. Social History:  Social History     Tobacco Use    Smoking status: Former Smoker    Smokeless tobacco: Never Used   Substance Use Topics    Alcohol use: No    Drug use: No       Allergies:  No Known Allergies    Review of Systems   Review of Systems   Constitutional: Negative for activity change, appetite change and chills. HENT: Negative for congestion, ear discharge, ear pain and sore throat. Eyes: Negative for photophobia and pain. Respiratory: Negative for cough and choking. Cardiovascular: Positive for palpitations. Negative for leg swelling. Gastrointestinal: Negative for anal bleeding and rectal pain. Endocrine: Negative for polydipsia and polyuria. Genitourinary: Negative for genital sores and urgency.    Musculoskeletal: Negative for arthralgias and myalgias. Neurological: Negative for dizziness, seizures and speech difficulty. Psychiatric/Behavioral: Negative for hallucinations, self-injury and suicidal ideas. Physical Exam     Patient Vitals for the past 12 hrs:   Temp Pulse Resp BP SpO2   01/08/21 2115 98.1 °F (36.7 °C) 76 17 124/77 96 %   01/08/21 1553  (!) 114  (!) 151/91    01/08/21 1439  (!) 108  (!) 151/88        Physical Exam  Vitals signs and nursing note reviewed. Constitutional:       Appearance: He is well-developed. HENT:      Head: Normocephalic and atraumatic. Eyes:      General:         Right eye: No discharge. Left eye: No discharge. Neck:      Musculoskeletal: Normal range of motion and neck supple. Cardiovascular:      Rate and Rhythm: Tachycardia present. Heart sounds: Normal heart sounds. No murmur. Pulmonary:      Effort: Pulmonary effort is normal. No respiratory distress. Breath sounds: Normal breath sounds. No stridor. No wheezing or rales. Chest:      Chest wall: No tenderness. Abdominal:      General: Bowel sounds are normal. There is no distension. Palpations: Abdomen is soft. Tenderness: There is no abdominal tenderness. There is no guarding or rebound. Musculoskeletal: Normal range of motion. Skin:     General: Skin is warm and dry. Neurological:      Mental Status: He is alert and oriented to person, place, and time.          Diagnostic Study Results     Labs -  Recent Results (from the past 12 hour(s))   URINALYSIS W/ RFLX MICROSCOPIC    Collection Time: 01/08/21  7:00 PM   Result Value Ref Range    Color YELLOW      Appearance CLEAR      Specific gravity 1.008 1.005 - 1.030      pH (UA) 8.0 5.0 - 8.0      Protein Negative NEG mg/dL    Glucose Negative NEG mg/dL    Ketone Negative NEG mg/dL    Bilirubin Negative NEG      Blood Negative NEG      Urobilinogen 0.2 0.2 - 1.0 EU/dL    Nitrites Negative NEG      Leukocyte Esterase Negative NEG Radiologic Studies -   Xr Chest Port    Result Date: 1/8/2021  IMPRESSION: No acute cardiopulmonary findings        Medical Decision Making     ED Course: Progress Notes, Reevaluation, and Consults:    1:37 PM Initial assessment performed. The patients presenting problems have been discussed, and they/their family are in agreement with the care plan formulated and outlined with them. I have encouraged them to ask questions as they arise throughout their visit. Provider Notes (Medical Decision Making):   Patient with reported SVT as per paramedics  When assessed patient numbers have left no available rhythm strip to assess  Patient received adenosine 6 mg and reverted to sinus rhythm  Patient in sinus tachycardia  EKG as interpreted by me: Heart rate 118, sinus tachycardia, no ST elevations  X-ray chest no acute cardiopulmonary findings  Patient given metoprolol IV 2.5mg and then 5 mg, tachycardia has now resolved patient heart rate in the 80s  First troponin not elevated  Labs as interpreted by me: No electrolyte abnormality  Discussed with SANDRA [place]  Patient will be admitted to telemetry for cardiac monitoring and cardiology evaluation        Vital Signs-Reviewed the patient's vital signs. Reviewed pt's pulse ox reading. Records Reviewed:  old medical records (Time of Review: 1:37 PM)  -I am the first provider for this patient.  -I reviewed the vital signs, available nursing notes, past medical history, past surgical history, family history and social history.     Current Facility-Administered Medications   Medication Dose Route Frequency Provider Last Rate Last Admin    dicyclomine (BENTYL) capsule 10 mg  10 mg Oral QID Chica Multani MD        dilTIAZem ER (CARDIZEM CD) capsule 240 mg  240 mg Oral DAILY Chica Multani MD        pantoprazole (PROTONIX) tablet 40 mg  40 mg Oral ACB Chica Multani MD        atorvastatin (LIPITOR) tablet 10 mg  10 mg Oral DAILY Chica Multani MD        sodium chloride (NS) flush 5-40 mL  5-40 mL IntraVENous Q8H Tapan Dunham MD   10 mL at 01/09/21 0029    sodium chloride (NS) flush 5-40 mL  5-40 mL IntraVENous PRN Tapan Dunham MD        acetaminophen (TYLENOL) tablet 650 mg  650 mg Oral Q6H PRN Tapan Dunham MD        Or   Creston Sicard acetaminophen (TYLENOL) suppository 650 mg  650 mg Rectal Q6H PRN Tapan Dunham MD        polyethylene glycol (MIRALAX) packet 17 g  17 g Oral DAILY PRN Tapan Dunham MD        ondansetron (ZOFRAN ODT) tablet 4 mg  4 mg Oral Q8H PRN Tapan Dunham MD        Or    [facility] injection 4 mg  4 mg IntraVENous Q6H PRN Tapan Dunham MD        enoxaparin (LOVENOX) injection 40 mg  40 mg SubCUTAneous DAILY Tapan Dunham MD        metoprolol tartrate (LOPRESSOR) tablet 25 mg  25 mg Oral Q12H Tapan Dunham MD   25 mg at 01/09/21 4808    aspirin chewable tablet 81 mg  81 mg Oral DAILY Lenita Phoenix, MD            Clinical Impression     Clinical Impression:   1. SVT [facility]supraventricular tachycardia) ([facility])        Disposition: admit     This note was dictated utilizing voice recognition software which may lead to typographical errors. I apologize in advance if the situation occurs. If questions arise please do not hesitate to contact me or call our department.     Sandi Abrams MD  1:37 PM

## 2021-01-08 NOTE — H&P
HISTORY & PHYSICAL      Patient: Pa Richards MRN: [id number]  CSN: [id number]    YOB: 1950  Age: 79 y.o. Sex: male    DOA: 1/8/2021 LOS:  LOS: 0 days        DOA: 1/8/2021        Assessment/Plan     Active Problems:    SVT (supraventricular tachycardia) ([address] (1/8/2021)        Plan:  1. SVTunclear etiology, received adenosine 6 mg with improvement in heart rate, hold by Cardizem 5 mg IV push and 2.5 mg IV push. Cardiology consulted, serial cardiac enzymes, echocardiogram, resume home dose of Cardizem, add beta-blocker. 2. History of hypertensionresume home medications, monitor blood pressure  3. History of type 2 diabetesinsulin sliding scale, monitor blood sugars  4. Hyperlipidemiacontinue Lipitor  DVT prophylaxisLovenox  Full code              HPI:     Pa Richards is a 79 y.o. male who is being admitted to the hospital for SVT. Patient mentions he came home today and noted to have significant palpitations. He said he had similar symptoms once in the past.  Patient mentions he has been history of hypertension, type 2 diabetes, hyperlipidemia and has been compliant with his medications. ER evaluationpatient noted to have SVTgiven adenosine 6 mg with improvement of heart rate. This was followed by Cardizem 5 mg IVP and 2.5 mg IVP. Patient's heart rate has improved, cardiology consulted from ER and advised admission for further evaluation. Patient is currently not having any palpitations, no chest pain, no shortness of breath. Patient will be admitted to the hospital for further evaluation and treatment. Past Medical History:   Diagnosis Date    Diabetes [facility] [address]     Hypertension        Past Surgical History:   Procedure Laterality Date    HX CARPAL TUNNEL RELEASE         No family history on file.     Social History     Socioeconomic History    Marital status:      Spouse name: Not on file    Number of children: Not on file    Years of education: Not on file    Highest education level: Not on file   Tobacco Use    Smoking status: Former Smoker    Smokeless tobacco: Never Used   Substance and Sexual Activity    Alcohol use: No    Drug use: No       Prior to Admission medications    Medication Sig Start Date End Date Taking? Authorizing Provider   omeprazole (PRILOSEC) 20 mg capsule Take 20 mg by mouth daily. Provider, Historical   simvastatin (ZOCOR) 20 mg tablet Take  by mouth nightly. Provider, Historical   chlorthalidone (HYGROTEN) 25 mg tablet Take  by mouth daily. Provider, Historical   potassium chloride SR (Klor-Con 10) 10 mEq tablet Take 10 mEq by mouth daily. Provider, Historical   cholecalciferol (Vitamin D3) 25 mcg (1,000 unit) cap Take 1,000 Units by mouth daily. Provider, Historical   aspirin delayed-release 81 mg tablet Take 81 mg by mouth daily. Provider, Historical   dicyclomine (BENTYL) 10 mg capsule Take 10 mg by mouth 4 times daily (before meals and nightly). Provider, Historical   HYDROcodone-acetaminophen (NORCO) 5-325 mg per tablet Take 1-2 tablets PO every 4-6 hours as needed for pain control. If over the counter ibuprofen or acetaminophen was suggested, then only take the vicodin for pain not well controlled with the over the counter medication. 10/11/16   Jessica Zapien PA   cyclobenzaprine (FLEXERIL) 5 mg tablet Take 1 Tab by mouth three (3) times daily as needed for Muscle Spasm(s). 10/11/16   Jessica Zapien PA   metFORMIN (GLUCOPHAGE) 500 mg tablet Take 1 Tab by mouth two (2) times daily (with meals). 1/3/16   Miryam Gan MD   ergocalciferol (VITAMIN D2) 50,000 unit capsule Take 50,000 Units by mouth every seven (7) days. Provider, Historical   tadalafil (CIALIS) 20 mg tablet Take 20 mg by mouth as needed for 6 doses. 6/24/13   Alhaji Rosenthal MD   diltiazem hcl 240 mg Tb24 Take 1 Tab by mouth daily.     Other, MD Dayne       No Known Allergies    Review of Systems  A comprehensive review of systems was negative except for that written in the History of Present Illness. Physical Exam:      Visit Vitals  BP (!) 151/91   Pulse (!) 114   Temp 98.4 °F (36.9 °C)   Resp 17   Ht 5' 6\" (1.676 m)   Wt 81.6 kg (180 lb)   SpO2 98%   BMI 29.05 kg/m²       Physical Exam:    Gen: In general, this is a well nourished male normal in no acute distress  HEENT: Sclerae nonicteric. Oral mucous membranes moist. Dentition normal  Neck: Supple with midline trachea. CV: RRR without murmur or rub appreciated. Resp:Respirations are unlabored without use of accessory muscles. Lung fields B/L without wheezes or rhonchi. Abd: Soft, nontender, nondistended. Extrem: Extremities are warm, without cyanosis or clubbing. No pitting pretibial edema. Skin: Warm, no visible rashes. Neuro: Patient is alert, oriented, and cooperative. No obvious focal defects. Moves all 4 extremities. Labs Reviewed:    Recent Results (from the past 24 hour(s))   METABOLIC PANEL, COMPREHENSIVE    Collection Time: 01/08/21 11:29 AM   Result Value Ref Range    Sodium 139 136 - 145 mmol/L    Potassium 3.6 3.5 - 5.5 mmol/L    Chloride 101 100 - 111 mmol/L    CO2 27 21 - 32 mmol/L    Anion gap 11 3.0 - 18 mmol/L    Glucose 162 (H) 74 - 99 mg/dL    BUN 17 7.0 - 18 MG/DL    Creatinine 1.31 (H) 0.6 - 1.3 MG/DL    BUN/Creatinine ratio 13 12 - 20      GFR est AA >60 >60 ml/min/1.73m2    GFR est non-AA 54 (L) >60 ml/min/1.73m2    Calcium 9.4 8.5 - 10.1 MG/DL    Bilirubin, total 0.8 0.2 - 1.0 MG/DL    ALT (SGPT) 71 (H) 16 - 61 U/L    AST (SGOT) 25 10 - 38 U/L    Alk.  phosphatase 87 45 - 117 U/L    Protein, total 8.8 (H) 6.4 - 8.2 g/dL    Albumin 4.1 3.4 - 5.0 g/dL    Globulin 4.7 (H) 2.0 - 4.0 g/dL    A-G Ratio 0.9 0.8 - 1.7     CBC WITH AUTOMATED DIFF    Collection Time: 01/08/21 11:29 AM   Result Value Ref Range    WBC 8.8 4.6 - 13.2 K/uL    RBC 5.15 4.70 - 5.50 M/uL    HGB 14.7 13.0 - 16.0 g/dL    HCT 42.2 36.0 - 48.0 %    MCV 81.9 74.0 - 97.0 FL    MCH 28.5 24.0 - 34.0 PG    MCHC 34.8 31.0 - 37.0 g/dL    RDW 13.6 11.6 - 14.5 %    PLATELET 851 838 - 058 K/uL    MPV 9.7 9.2 - 11.8 FL    NEUTROPHILS 29 (L) 42 - 75 %    LYMPHOCYTES 51 20 - 51 %    MONOCYTES 16 (H) 2 - 9 %    EOSINOPHILS 4 0 - 5 %    BASOPHILS 0 0 - 3 %    ABS. NEUTROPHILS 2.6 1.8 - 8.0 K/UL    ABS. LYMPHOCYTES 4.4 (H) 0.8 - 3.5 K/UL    ABS. MONOCYTES 1.4 (H) 0 - 1.0 K/UL    ABS. EOSINOPHILS 0.4 0.0 - 0.4 K/UL    ABS. BASOPHILS 0.0 0.0 - 0.06 K/UL    DF MANUAL      PLATELET COMMENTS ADEQUATE PLATELETS      RBC COMMENTS NORMOCYTIC, NORMOCHROMIC     CARDIAC PANEL,(CK, CKMB & TROPONIN)    Collection Time: 01/08/21 11:29 AM   Result Value Ref Range    CK - MB <1.0 <3.6 ng/ml    CK-MB Index  0.0 - 4.0 %     CALCULATION NOT PERFORMED WHEN RESULT IS BELOW LINEAR LIMIT    CK 41 39 - 308 U/L    Troponin-I, QT <0.02 0.0 - 0.045 NG/ML   EKG, 12 LEAD, INITIAL    Collection Time: 01/08/21 12:43 PM   Result Value Ref Range    Ventricular Rate 118 BPM    Atrial Rate 118 BPM    P-R Interval 172 ms    QRS Duration 92 ms    Q-T Interval 318 ms    QTC Calculation (Bezet) 445 ms    Calculated P Axis 61 degrees    Calculated R Axis 67 degrees    Calculated T Axis 58 degrees    Diagnosis       Sinus tachycardia  Nonspecific ST abnormality  Abnormal ECG  When compared with ECG of 05-JAN-2016 09:16,  Vent. rate has increased BY  42 BPM         Imaging Reviewed:    XR Results (most recent):  Results from Hospital Encounter encounter on 01/08/21   XR CHEST PORT    Narrative INDICATION: chest pain, tachycardia    TECHNIQUE: Portable chest radiograph    COMPARISON: 3 January 2016    FINDINGS: The lungs are adequately inflated. Right mid lung linear atelectasis  or scarring. No focal consolidation, effusion or pneumothorax. Heart size within  normal limits. No acute osseous abnormality.       Impression IMPRESSION:     No acute cardiopulmonary findings        CT Results (most recent):  No results found for this or any previous visit. Isaiah Muñoz MD  1/8/2021, 5:06 PM        Disclaimer: Sections of this note are dictated using utilizing voice recognition software. Minor typographical errors may be present. If questions arise, please do not hesitate to contact me or call our department.

## 2021-01-08 NOTE — Clinical Note
Status[de-identified] INPATIENT [101]   Type of Bed: Telemetry [19]   Inpatient Hospitalization Certified Necessary for the Following Reasons: 3.  Patient receiving treatment that can only be provided in an inpatient setting (further clarification in H&P documentation)   Admitting Diagnosis: SVT (supraventricular tachycardia) [facility]) [[id number]]   Admitting Physician: Maycol Delgado [[id number]]   Attending Physician: Maycol Delgado [[id number]]   Estimated Length of Stay: 2 Midnights   Discharge Plan[de-identified] Home with Office Follow-up

## 2021-01-09 ENCOUNTER — APPOINTMENT (OUTPATIENT)
Dept: NON INVASIVE DIAGNOSTICS | Age: 71
DRG: 310 | End: 2021-01-09
Attending: INTERNAL MEDICINE
Payer: MEDICARE

## 2021-01-09 LAB
ALBUMIN SERPL-MCNC: 3.5 G/DL (ref 3.4–5)
ALBUMIN/GLOB SERPL: 0.9 {RATIO} (ref 0.8–1.7)
ALP SERPL-CCNC: 63 U/L (ref 45–117)
ALT SERPL-CCNC: 59 U/L (ref 16–61)
ANION GAP SERPL CALC-SCNC: 6 MMOL/L (ref 3–18)
AST SERPL-CCNC: 23 U/L (ref 10–38)
BASOPHILS # BLD: 0 K/UL (ref 0–0.1)
BASOPHILS NFR BLD: 0 % (ref 0–2)
BILIRUB SERPL-MCNC: 0.4 MG/DL (ref 0.2–1)
BUN SERPL-MCNC: 15 MG/DL (ref 7–18)
BUN/CREAT SERPL: 13 (ref 12–20)
CALCIUM SERPL-MCNC: 8.8 MG/DL (ref 8.5–10.1)
CHLORIDE SERPL-SCNC: 105 MMOL/L (ref 100–111)
CO2 SERPL-SCNC: 27 MMOL/L (ref 21–32)
CREAT SERPL-MCNC: 1.13 MG/DL (ref 0.6–1.3)
DIFFERENTIAL METHOD BLD: NORMAL
EOSINOPHIL # BLD: 0.1 K/UL (ref 0–0.4)
EOSINOPHIL NFR BLD: 2 % (ref 0–5)
ERYTHROCYTE [DISTWIDTH] IN BLOOD BY AUTOMATED COUNT: 13.7 % (ref 11.6–14.5)
EST. AVERAGE GLUCOSE BLD GHB EST-MCNC: 146 MG/DL
GLOBULIN SER CALC-MCNC: 4 G/DL (ref 2–4)
GLUCOSE BLD STRIP.AUTO-MCNC: 160 MG/DL (ref 70–110)
GLUCOSE SERPL-MCNC: 148 MG/DL (ref 74–99)
HBA1C MFR BLD: 6.7 % (ref 4.2–5.6)
HCT VFR BLD AUTO: 39.4 % (ref 36–48)
HGB BLD-MCNC: 13.1 G/DL (ref 13–16)
LYMPHOCYTES # BLD: 2.7 K/UL (ref 0.9–3.6)
LYMPHOCYTES NFR BLD: 39 % (ref 21–52)
MCH RBC QN AUTO: 27.2 PG (ref 24–34)
MCHC RBC AUTO-ENTMCNC: 33.2 G/DL (ref 31–37)
MCV RBC AUTO: 81.7 FL (ref 74–97)
MONOCYTES # BLD: 0.5 K/UL (ref 0.05–1.2)
MONOCYTES NFR BLD: 8 % (ref 3–10)
NEUTS SEG # BLD: 3.5 K/UL (ref 1.8–8)
NEUTS SEG NFR BLD: 51 % (ref 40–73)
PLATELET # BLD AUTO: 298 K/UL (ref 135–420)
PMV BLD AUTO: 10.1 FL (ref 9.2–11.8)
POTASSIUM SERPL-SCNC: 3.6 MMOL/L (ref 3.5–5.5)
PROT SERPL-MCNC: 7.5 G/DL (ref 6.4–8.2)
RBC # BLD AUTO: 4.82 M/UL (ref 4.7–5.5)
SODIUM SERPL-SCNC: 138 MMOL/L (ref 136–145)
TROPONIN I SERPL-MCNC: <0.02 NG/ML (ref 0–0.04)
TROPONIN I SERPL-MCNC: <0.02 NG/ML (ref 0–0.04)
WBC # BLD AUTO: 6.8 K/UL (ref 4.6–13.2)

## 2021-01-09 PROCEDURE — 85025 COMPLETE CBC W/AUTO DIFF WBC: CPT

## 2021-01-09 PROCEDURE — 36415 COLL VENOUS BLD VENIPUNCTURE: CPT

## 2021-01-09 PROCEDURE — 93306 TTE W/DOPPLER COMPLETE: CPT

## 2021-01-09 PROCEDURE — 84484 ASSAY OF TROPONIN QUANT: CPT

## 2021-01-09 PROCEDURE — 74011250637 HC RX REV CODE- 250/637: Performed by: HOSPITALIST

## 2021-01-09 PROCEDURE — 99232 SBSQ HOSP IP/OBS MODERATE 35: CPT | Performed by: INTERNAL MEDICINE

## 2021-01-09 PROCEDURE — 74011250636 HC RX REV CODE- 250/636: Performed by: HOSPITALIST

## 2021-01-09 PROCEDURE — 93306 TTE W/DOPPLER COMPLETE: CPT | Performed by: INTERNAL MEDICINE

## 2021-01-09 PROCEDURE — 82962 GLUCOSE BLOOD TEST: CPT

## 2021-01-09 PROCEDURE — 74011636637 HC RX REV CODE- 636/637: Performed by: EMERGENCY MEDICINE

## 2021-01-09 PROCEDURE — 83036 HEMOGLOBIN GLYCOSYLATED A1C: CPT

## 2021-01-09 PROCEDURE — 65660000000 HC RM CCU STEPDOWN

## 2021-01-09 PROCEDURE — 74011250637 HC RX REV CODE- 250/637: Performed by: INTERNAL MEDICINE

## 2021-01-09 PROCEDURE — 80053 COMPREHEN METABOLIC PANEL: CPT

## 2021-01-09 RX ORDER — INSULIN LISPRO 100 [IU]/ML
INJECTION, SOLUTION INTRAVENOUS; SUBCUTANEOUS
Status: DISCONTINUED | OUTPATIENT
Start: 2021-01-09 | End: 2021-01-10 | Stop reason: HOSPADM

## 2021-01-09 RX ORDER — DILTIAZEM HYDROCHLORIDE 240 MG/1
240 CAPSULE, COATED, EXTENDED RELEASE ORAL DAILY
Status: DISCONTINUED | OUTPATIENT
Start: 2021-01-09 | End: 2021-01-10 | Stop reason: HOSPADM

## 2021-01-09 RX ORDER — INSULIN LISPRO 100 [IU]/ML
0.05 INJECTION, SOLUTION INTRAVENOUS; SUBCUTANEOUS
Status: DISCONTINUED | OUTPATIENT
Start: 2021-01-09 | End: 2021-01-09

## 2021-01-09 RX ORDER — ATORVASTATIN CALCIUM 10 MG/1
10 TABLET, FILM COATED ORAL DAILY
Status: DISCONTINUED | OUTPATIENT
Start: 2021-01-09 | End: 2021-01-10 | Stop reason: HOSPADM

## 2021-01-09 RX ORDER — MAGNESIUM SULFATE 100 %
4 CRYSTALS MISCELLANEOUS AS NEEDED
Status: DISCONTINUED | OUTPATIENT
Start: 2021-01-09 | End: 2021-01-10 | Stop reason: HOSPADM

## 2021-01-09 RX ORDER — DICYCLOMINE HYDROCHLORIDE 10 MG/1
10 CAPSULE ORAL 4 TIMES DAILY
Status: DISCONTINUED | OUTPATIENT
Start: 2021-01-09 | End: 2021-01-10 | Stop reason: HOSPADM

## 2021-01-09 RX ORDER — ASPIRIN 81 MG/1
81 TABLET ORAL DAILY
Status: DISCONTINUED | OUTPATIENT
Start: 2021-01-09 | End: 2021-01-09

## 2021-01-09 RX ORDER — PANTOPRAZOLE SODIUM 40 MG/1
40 TABLET, DELAYED RELEASE ORAL
Status: DISCONTINUED | OUTPATIENT
Start: 2021-01-09 | End: 2021-01-10 | Stop reason: HOSPADM

## 2021-01-09 RX ORDER — DEXTROSE 50 % IN WATER (D50W) INTRAVENOUS SYRINGE
25-50 AS NEEDED
Status: DISCONTINUED | OUTPATIENT
Start: 2021-01-09 | End: 2021-01-10 | Stop reason: HOSPADM

## 2021-01-09 RX ADMIN — METOPROLOL TARTRATE 25 MG: 25 TABLET, FILM COATED ORAL at 00:27

## 2021-01-09 RX ADMIN — DICYCLOMINE HYDROCHLORIDE 10 MG: 10 CAPSULE ORAL at 13:52

## 2021-01-09 RX ADMIN — DICYCLOMINE HYDROCHLORIDE 10 MG: 10 CAPSULE ORAL at 17:31

## 2021-01-09 RX ADMIN — INSULIN LISPRO 2 UNITS: 100 INJECTION, SOLUTION INTRAVENOUS; SUBCUTANEOUS at 23:33

## 2021-01-09 RX ADMIN — DILTIAZEM HYDROCHLORIDE 240 MG: 240 CAPSULE, COATED, EXTENDED RELEASE ORAL at 09:33

## 2021-01-09 RX ADMIN — METOPROLOL TARTRATE 25 MG: 25 TABLET, FILM COATED ORAL at 21:33

## 2021-01-09 RX ADMIN — ENOXAPARIN SODIUM 40 MG: 40 INJECTION SUBCUTANEOUS at 09:36

## 2021-01-09 RX ADMIN — DICYCLOMINE HYDROCHLORIDE 10 MG: 10 CAPSULE ORAL at 09:33

## 2021-01-09 RX ADMIN — PANTOPRAZOLE SODIUM 40 MG: 40 TABLET, DELAYED RELEASE ORAL at 09:33

## 2021-01-09 RX ADMIN — Medication 10 ML: at 21:34

## 2021-01-09 RX ADMIN — Medication 10 ML: at 00:29

## 2021-01-09 RX ADMIN — ASPIRIN 81 MG: 81 TABLET, CHEWABLE ORAL at 09:33

## 2021-01-09 RX ADMIN — Medication 10 ML: at 06:08

## 2021-01-09 RX ADMIN — Medication 10 ML: at 13:52

## 2021-01-09 RX ADMIN — METOPROLOL TARTRATE 25 MG: 25 TABLET, FILM COATED ORAL at 09:33

## 2021-01-09 RX ADMIN — DICYCLOMINE HYDROCHLORIDE 10 MG: 10 CAPSULE ORAL at 21:33

## 2021-01-09 RX ADMIN — ATORVASTATIN CALCIUM 10 MG: 10 TABLET, FILM COATED ORAL at 09:33

## 2021-01-09 NOTE — PROGRESS NOTES
[facility], [address] [facility], [phone] [facility]  Fax: [phone]      CARDIOLOGY PROGRESS NOTE  RECS:  1) SVT - per chart 1 episode- resolved with adenosine. TSH/ electrolytes wnl. Continue lopressor. 2) Hypertension  3) Dyslipidemia    ASSESSMENT:  Hospital Problems  Date Reviewed: 9/23/2020          Codes Class Noted POA    SVT (supraventricular tachycardia) ([facility] [address] ICD-10-CM: I47.1  ICD-9-CM: 427.89  1/8/2021 Unknown                SUBJECTIVE:  No c/o chest pain. No c/o dyspnea. OBJECTIVE:    VS:   Visit Vitals  BP (!) 161/83 (BP 1 Location: Right arm, BP Patient Position: At rest;Head of bed elevated (Comment degrees))   Pulse 67   Temp 98.7 °F (37.1 °C)   Resp 18   Ht 5' 6\" (1.676 m)   Wt 81.6 kg (180 lb)   SpO2 95%   BMI 29.05 kg/m²         Intake/Output Summary (Last 24 hours) at 1/9/2021 1503  Last data filed at 1/8/2021 2133  Gross per 24 hour   Intake 1000 ml   Output    Net 1000 ml     TELE: normal sinus rhythm    General: No acute distress  HENT: Normocephalic, atraumatic. Neck :  Supple  Cardiac:  Normal S1/S2  Chest/Lungs:Clear to auscultation  Abdomen: Soft  Extremities:  No edema      Labs: Results:       Chemistry Recent Labs     01/09/21  0322 01/08/21  1129   * 162*    139   K 3.6 3.6    101   CO2 27 27   BUN 15 17   CREA 1.13 1.31*   CA 8.8 9.4   MG  --  2.2   AGAP 6 11   BUCR 13 13   AP 63 87   TP 7.5 8.8*   ALB 3.5 4.1   GLOB 4.0 4.7*   AGRAT 0.9 0.9      CBC w/Diff Recent Labs     01/09/21  0322 01/08/21  1129   WBC 6.8 8.8   RBC 4.82 5.15   HGB 13.1 14.7   HCT 39.4 42.2    295   GRANS 51 29*   LYMPH 39 51   EOS 2 4      Cardiac Enzymes Recent Labs     01/08/21  1129   CPK 41   CKND1 CALCULATION NOT PERFORMED WHEN RESULT IS BELOW LINEAR LIMIT      Coagulation No results for input(s): PTP, INR, APTT, INREXT in the last 72 hours.     Lipid Panel No results found for: CHOL, CHOLPOCT, CHOLX, CHLST, CHOLV, [id number], HDL, HDLP, LDL, LDLC, DLDLP, [id number], VLDLC, VLDL, TGLX, TRIGL, TRIGP, TGLPOCT, CHHD, CHHDX   BNP No results for input(s): BNPP in the last 72 hours.    Liver Enzymes Recent Labs     01/09/21  0322   TP 7.5   ALB 3.5   AP 63      Digoxin    Thyroid Studies Lab Results   Component Value Date/Time    TSH 1.06 01/03/2016 12:55 PM              Junior Darron MD   Pager # [phone]

## 2021-01-09 NOTE — ED NOTES
Assumed care of patient from [facility]   Introduced self to pt  Pt alert and oriented x 4   Laying in bed resting  Pt denied chest pain   Updated whiteboard and explained usage  Connected to monitor SR  Positioned in bed for comfort  Will continue to monitor and assess

## 2021-01-09 NOTE — ED NOTES
TRANSFER - OUT REPORT:    Verbal report given to SYULI (name) on Shaila Mcgregor  being transferred to 4 N (unit) for routine progression of care       Report consisted of patients Situation, Background, Assessment and   Recommendations(SBAR). Information from the following report(s) SBAR, ED Summary, MAR and Med Rec Status was reviewed with the receiving nurse. Lines:   Peripheral IV 01/08/21 Right Antecubital (Active)   Site Assessment Clean, dry, & intact 01/08/21 1252   Phlebitis Assessment 0 01/08/21 1252   Infiltration Assessment 0 01/08/21 1252   Dressing Status Clean, dry, & intact 01/08/21 1252   Dressing Type Tape;Transparent 01/08/21 1252   Hub Color/Line Status Pink;Flushed;Patent 01/08/21 1252   Action Taken Blood drawn 01/08/21 1252        Opportunity for questions and clarification was provided.       Patient transported with:  Personal belongings   Transported with Transport

## 2021-01-09 NOTE — PROGRESS NOTES
[facility]  Progress Note    Patient: Nell Boogie Age: 79 y.o. : 1950 MR#: [id number] SSN: [id number]  Date/Time: 2021    Subjective:     Pt denies palpitations. C/o heart burn symptoms. Assessment/Plan:     79year old male admitted for SVTs. -SVT with no recurrence status post adenosine. He did have cardiac enzymes x3 cardiology evaluated. Started on beta-blocker, continued on Cardizem, cardiac echo has been done but enzymes pending. Awaiting follow-up evaluation and recommendations by cardiology.     HISTORY OF:  -HTN  -Type II DM  -Dyslipidemia    PLAN:  -Cont current chronotrops  -On Metformin in the outpatient setting, will hold for now and continue to monitor sugars, corrective insulin  -Continue aspirin and Zocor    Additional Notes:      Case discussed with:  [x]Patient  []Family  []Nursing  []Case Management  DVT Prophylaxis:  [x]Lovenox  []Hep SQ  []SCDs  []Coumadin   []On Heparin gtt    Objective:   VS:   Visit Vitals  /77   Pulse 71   Temp 98.5 °F (36.9 °C)   Resp 20   Ht 5' 6\" (1.676 m)   Wt 81.6 kg (180 lb)   SpO2 93%   BMI 29.05 kg/m²      Tmax/24hrs: Temp (24hrs), Av.1 °F (36.7 °C), Min:97.5 °F (36.4 °C), Max:98.7 °F (37.1 °C)    Input/Output:     Intake/Output Summary (Last 24 hours) at 2021 1731  Last data filed at 2021 2133  Gross per 24 hour   Intake 1000 ml   Output    Net 1000 ml       General:alert, awake, in nad    Cardiovascular:  rrr no murmurs  Pulmonary:  ctab  GI:  Soft, nt, nd  Extremities:  No edema  Additional:  No focal neuro abnormalities    Labs:    Recent Results (from the past 24 hour(s))   URINALYSIS W/ RFLX MICROSCOPIC    Collection Time: 21  7:00 PM   Result Value Ref Range    Color YELLOW      Appearance CLEAR      Specific gravity 1.008 1.005 - 1.030      pH (UA) 8.0 5.0 - 8.0      Protein Negative NEG mg/dL    Glucose Negative NEG mg/dL    Ketone Negative NEG mg/dL    Bilirubin Negative NEG      Blood Negative NEG      Urobilinogen 0.2 0.2 - 1.0 EU/dL    Nitrites Negative NEG      Leukocyte Esterase Negative NEG     METABOLIC PANEL, COMPREHENSIVE    Collection Time: 01/09/21  3:22 AM   Result Value Ref Range    Sodium 138 136 - 145 mmol/L    Potassium 3.6 3.5 - 5.5 mmol/L    Chloride 105 100 - 111 mmol/L    CO2 27 21 - 32 mmol/L    Anion gap 6 3.0 - 18 mmol/L    Glucose 148 (H) 74 - 99 mg/dL    BUN 15 7.0 - 18 MG/DL    Creatinine 1.13 0.6 - 1.3 MG/DL    BUN/Creatinine ratio 13 12 - 20      GFR est AA >60 >60 ml/min/1.73m2    GFR est non-AA >60 >60 ml/min/1.73m2    Calcium 8.8 8.5 - 10.1 MG/DL    Bilirubin, total 0.4 0.2 - 1.0 MG/DL    ALT (SGPT) 59 16 - 61 U/L    AST (SGOT) 23 10 - 38 U/L    Alk. phosphatase 63 45 - 117 U/L    Protein, total 7.5 6.4 - 8.2 g/dL    Albumin 3.5 3.4 - 5.0 g/dL    Globulin 4.0 2.0 - 4.0 g/dL    A-G Ratio 0.9 0.8 - 1.7     CBC WITH AUTOMATED DIFF    Collection Time: 01/09/21  3:22 AM   Result Value Ref Range    WBC 6.8 4.6 - 13.2 K/uL    RBC 4.82 4.70 - 5.50 M/uL    HGB 13.1 13.0 - 16.0 g/dL    HCT 39.4 36.0 - 48.0 %    MCV 81.7 74.0 - 97.0 FL    MCH 27.2 24.0 - 34.0 PG    MCHC 33.2 31.0 - 37.0 g/dL    RDW 13.7 11.6 - 14.5 %    PLATELET 740 661 - 679 K/uL    MPV 10.1 9.2 - 11.8 FL    NEUTROPHILS 51 40 - 73 %    LYMPHOCYTES 39 21 - 52 %    MONOCYTES 8 3 - 10 %    EOSINOPHILS 2 0 - 5 %    BASOPHILS 0 0 - 2 %    ABS. NEUTROPHILS 3.5 1.8 - 8.0 K/UL    ABS. LYMPHOCYTES 2.7 0.9 - 3.6 K/UL    ABS. MONOCYTES 0.5 0.05 - 1.2 K/UL    ABS. EOSINOPHILS 0.1 0.0 - 0.4 K/UL    ABS.  BASOPHILS 0.0 0.0 - 0.1 K/UL    DF AUTOMATED     TROPONIN I    Collection Time: 01/09/21  3:22 AM   Result Value Ref Range    Troponin-I, QT <0.02 0.0 - 0.045 NG/ML   ECHO ADULT COMPLETE    Collection Time: 01/09/21  8:00 AM   Result Value Ref Range    IVSd 1.42 (A) 0.6 - 1.0 cm    LVIDd 3.98 (A) 4.2 - 5.9 cm    LVIDs 2.50 cm    LVPWd 1.38 (A) 0.6 - 1.0 cm    Left Atrium Major Axis 2.81 cm    LA Area 4C 14.96 cm2    LA Vol 4C 31.65 18 - 58 mL    MV A Angelo 70.67 cm/s    Mitral Valve E Wave Deceleration Time 172.74 ms    MV E Angelo 61.95 cm/s    AO ASC D 3.59 cm    Ao Root D 2.94 cm    MV E/A 0.88     LV Mass .5 88 - 224 g    LV Mass AL Index 108.5 49 - 115 g/m2    Left Atrium Minor Axis 1.47 cm    LA Vol Index 16.55 16 - 28 ml/m2   TROPONIN I    Collection Time: 01/09/21 11:34 AM   Result Value Ref Range    Troponin-I, QT <0.02 0.0 - 0.045 NG/ML     Additional Data Reviewed:      Signed By: Melissa Connell MD     January 9, 2021 5:31 PM

## 2021-01-09 NOTE — ROUTINE PROCESS
Bedside and Verbal shift change report given to Meño Ballard (oncoming nurse) by Robbie Dominguez RN (offgoing nurse). Report included the following information SBAR, Kardex and MAR.

## 2021-01-10 VITALS
TEMPERATURE: 97.7 F | BODY MASS INDEX: 28.93 KG/M2 | RESPIRATION RATE: 18 BRPM | HEART RATE: 60 BPM | HEIGHT: 66 IN | WEIGHT: 180 LBS | DIASTOLIC BLOOD PRESSURE: 78 MMHG | SYSTOLIC BLOOD PRESSURE: 128 MMHG | OXYGEN SATURATION: 95 %

## 2021-01-10 LAB
ALBUMIN SERPL-MCNC: 3.6 G/DL (ref 3.4–5)
ALBUMIN/GLOB SERPL: 1 {RATIO} (ref 0.8–1.7)
ALP SERPL-CCNC: 66 U/L (ref 45–117)
ALT SERPL-CCNC: 84 U/L (ref 16–61)
ANION GAP SERPL CALC-SCNC: 5 MMOL/L (ref 3–18)
AST SERPL-CCNC: 41 U/L (ref 10–38)
BASOPHILS # BLD: 0 K/UL (ref 0–0.1)
BASOPHILS NFR BLD: 0 % (ref 0–2)
BILIRUB SERPL-MCNC: 0.7 MG/DL (ref 0.2–1)
BUN SERPL-MCNC: 17 MG/DL (ref 7–18)
BUN/CREAT SERPL: 13 (ref 12–20)
CALCIUM SERPL-MCNC: 9.3 MG/DL (ref 8.5–10.1)
CHLORIDE SERPL-SCNC: 103 MMOL/L (ref 100–111)
CO2 SERPL-SCNC: 31 MMOL/L (ref 21–32)
CREAT SERPL-MCNC: 1.3 MG/DL (ref 0.6–1.3)
DIFFERENTIAL METHOD BLD: ABNORMAL
EOSINOPHIL # BLD: 0.2 K/UL (ref 0–0.4)
EOSINOPHIL NFR BLD: 4 % (ref 0–5)
ERYTHROCYTE [DISTWIDTH] IN BLOOD BY AUTOMATED COUNT: 13.7 % (ref 11.6–14.5)
EST. AVERAGE GLUCOSE BLD GHB EST-MCNC: 143 MG/DL
GLOBULIN SER CALC-MCNC: 3.7 G/DL (ref 2–4)
GLUCOSE BLD STRIP.AUTO-MCNC: 136 MG/DL (ref 70–110)
GLUCOSE BLD STRIP.AUTO-MCNC: 165 MG/DL (ref 70–110)
GLUCOSE SERPL-MCNC: 125 MG/DL (ref 74–99)
HBA1C MFR BLD: 6.6 % (ref 4.2–5.6)
HCT VFR BLD AUTO: 39.7 % (ref 36–48)
HGB BLD-MCNC: 13.3 G/DL (ref 13–16)
LYMPHOCYTES # BLD: 2.1 K/UL (ref 0.9–3.6)
LYMPHOCYTES NFR BLD: 35 % (ref 21–52)
MCH RBC QN AUTO: 27.7 PG (ref 24–34)
MCHC RBC AUTO-ENTMCNC: 33.5 G/DL (ref 31–37)
MCV RBC AUTO: 82.5 FL (ref 74–97)
MONOCYTES # BLD: 0.7 K/UL (ref 0.05–1.2)
MONOCYTES NFR BLD: 12 % (ref 3–10)
NEUTS SEG # BLD: 2.9 K/UL (ref 1.8–8)
NEUTS SEG NFR BLD: 49 % (ref 40–73)
PLATELET # BLD AUTO: 279 K/UL (ref 135–420)
PMV BLD AUTO: 10 FL (ref 9.2–11.8)
POTASSIUM SERPL-SCNC: 4.3 MMOL/L (ref 3.5–5.5)
PROT SERPL-MCNC: 7.3 G/DL (ref 6.4–8.2)
RBC # BLD AUTO: 4.81 M/UL (ref 4.7–5.5)
SODIUM SERPL-SCNC: 139 MMOL/L (ref 136–145)
WBC # BLD AUTO: 5.9 K/UL (ref 4.6–13.2)

## 2021-01-10 PROCEDURE — 85025 COMPLETE CBC W/AUTO DIFF WBC: CPT

## 2021-01-10 PROCEDURE — 80053 COMPREHEN METABOLIC PANEL: CPT

## 2021-01-10 PROCEDURE — 74011250636 HC RX REV CODE- 250/636: Performed by: HOSPITALIST

## 2021-01-10 PROCEDURE — 74011250637 HC RX REV CODE- 250/637: Performed by: INTERNAL MEDICINE

## 2021-01-10 PROCEDURE — 83036 HEMOGLOBIN GLYCOSYLATED A1C: CPT

## 2021-01-10 PROCEDURE — 82962 GLUCOSE BLOOD TEST: CPT

## 2021-01-10 PROCEDURE — 99239 HOSP IP/OBS DSCHRG MGMT >30: CPT | Performed by: INTERNAL MEDICINE

## 2021-01-10 PROCEDURE — 74011250637 HC RX REV CODE- 250/637: Performed by: HOSPITALIST

## 2021-01-10 PROCEDURE — 74011636637 HC RX REV CODE- 636/637: Performed by: EMERGENCY MEDICINE

## 2021-01-10 PROCEDURE — 36415 COLL VENOUS BLD VENIPUNCTURE: CPT

## 2021-01-10 PROCEDURE — 2709999900 HC NON-CHARGEABLE SUPPLY

## 2021-01-10 RX ORDER — METOPROLOL TARTRATE 25 MG/1
25 TABLET, FILM COATED ORAL EVERY 12 HOURS
Qty: 60 TAB | Refills: 0 | Status: SHIPPED | OUTPATIENT
Start: 2021-01-10 | End: 2021-01-10 | Stop reason: SDUPTHER

## 2021-01-10 RX ORDER — METOPROLOL TARTRATE 25 MG/1
12.5 TABLET, FILM COATED ORAL EVERY 12 HOURS
Qty: 30 TAB | Refills: 0 | Status: SHIPPED | OUTPATIENT
Start: 2021-01-10 | End: 2021-01-10

## 2021-01-10 RX ADMIN — METOPROLOL TARTRATE 25 MG: 25 TABLET, FILM COATED ORAL at 08:18

## 2021-01-10 RX ADMIN — Medication 10 ML: at 06:19

## 2021-01-10 RX ADMIN — DICYCLOMINE HYDROCHLORIDE 10 MG: 10 CAPSULE ORAL at 14:15

## 2021-01-10 RX ADMIN — PANTOPRAZOLE SODIUM 40 MG: 40 TABLET, DELAYED RELEASE ORAL at 08:18

## 2021-01-10 RX ADMIN — ATORVASTATIN CALCIUM 10 MG: 10 TABLET, FILM COATED ORAL at 08:18

## 2021-01-10 RX ADMIN — ENOXAPARIN SODIUM 40 MG: 40 INJECTION SUBCUTANEOUS at 08:18

## 2021-01-10 RX ADMIN — DICYCLOMINE HYDROCHLORIDE 10 MG: 10 CAPSULE ORAL at 08:18

## 2021-01-10 RX ADMIN — INSULIN LISPRO 2 UNITS: 100 INJECTION, SOLUTION INTRAVENOUS; SUBCUTANEOUS at 11:44

## 2021-01-10 RX ADMIN — DILTIAZEM HYDROCHLORIDE 240 MG: 240 CAPSULE, COATED, EXTENDED RELEASE ORAL at 08:18

## 2021-01-10 RX ADMIN — ASPIRIN 81 MG: 81 TABLET, CHEWABLE ORAL at 08:18

## 2021-01-10 RX ADMIN — Medication 10 ML: at 14:18

## 2021-01-10 NOTE — DISCHARGE SUMMARY
[facility]  [facility]       Patient: Rosa Valladares Age: 79 y.o. : 1950 MR#: [id number] [id number]: [id number]  PCP on record: Sienna Borges MD  Admit date: 2021  Discharge date: 1/10/2021    Consults:  -OBIE Farmer,-cardiology  -   Procedures: none  -     Significant Diagnostic Studies: -CXR 21:  IMPRESSION:      No acute cardiopulmonary findings  -Cardiac echo 21:  Result status: Final result   · LV: Estimated LVEF is 55 - 60%. Visually measured ejection fraction. Normal cavity size and systolic function (ejection fraction normal). Mild concentric hypertrophy. Mild (grade 1) left ventricular diastolic dysfunction. · Insufficient TR to estimate pulmonary artery pressure. 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    -SVT with no recurrence status post adenosine with resolution, no recurrence after admission. He did have cardiac enzymes x3, negative. cardiology evaluated. Started on beta-blocker, continued on Cardizem, cardiac echo has been done, result pasted above. He was to be bradycardic with a heart rate jumping as low as the low 50s, patient was asymptomatic with this type and heart rate. Due to the bradycardia the beta-blocker started during his hospital stay will not be continued upon discharge. Case discussed with the cardiologist on call with recommendations made to go back to his previous regimen of Cardizem CD. If patient continues to have recurrent episodes of supraventricular tachycardias he may be a candidate for electrophysiology study and intervention. He has been instructed to follow-up with the cardiology group that involved in his care here.   Patient has been given their phone number and address to initiate contact should he not hear from the cardiology group after discharge. He has been instructed to continue his previous regimen of medications. On date of discharge he had no complaints. He denied having any episodes of palpitations or chest pain. He did to go home       HISTORY OF:  -HTN  -Type II DM  -Dyslipidemia                 Today's examination of the patient revealed:     Subjective:     Objective:   VS:   Visit Vitals  /78   Pulse 60   Temp 97.7 °F (36.5 °C)   Resp 18   Ht 5' 6\" (1.676 m)   Wt 81.6 kg (180 lb)   SpO2 95%   BMI 29.05 kg/m²      Tmax/24hrs: Temp (24hrs), Av.1 °F (36.7 °C), Min:97.7 °F (36.5 °C), Max:98.5 °F (36.9 °C)     Input/Output: No intake or output data in the 24 hours ending 01/10/21 1414    General:  Alert, awake, in nad  Cardiovascular:  Rrr, no murmurs  Pulmonary:  ctab  GI:  Soft, nt, nd  Extremities:  No edema  Additional:      Labs:    Recent Results (from the past 24 hour(s))   GLUCOSE, POC    Collection Time: 21  9:04 PM   Result Value Ref Range    Glucose (POC) 160 (H) 70 - 110 mg/dL   HEMOGLOBIN A1C WITH EAG    Collection Time: 01/10/21  4:55 AM   Result Value Ref Range    Hemoglobin A1c 6.6 (H) 4.2 - 5.6 %    Est. average glucose 558 mg/dL   METABOLIC PANEL, COMPREHENSIVE    Collection Time: 01/10/21  4:55 AM   Result Value Ref Range    Sodium 139 136 - 145 mmol/L    Potassium 4.3 3.5 - 5.5 mmol/L    Chloride 103 100 - 111 mmol/L    CO2 31 21 - 32 mmol/L    Anion gap 5 3.0 - 18 mmol/L    Glucose 125 (H) 74 - 99 mg/dL    BUN 17 7.0 - 18 MG/DL    Creatinine 1.30 0.6 - 1.3 MG/DL    BUN/Creatinine ratio 13 12 - 20      GFR est AA >60 >60 ml/min/1.73m2    GFR est non-AA 55 (L) >60 ml/min/1.73m2    Calcium 9.3 8.5 - 10.1 MG/DL    Bilirubin, total 0.7 0.2 - 1.0 MG/DL    ALT (SGPT) 84 (H) 16 - 61 U/L    AST (SGOT) 41 (H) 10 - 38 U/L    Alk.  phosphatase 66 45 - 117 U/L    Protein, total 7.3 6.4 - 8.2 g/dL Albumin 3.6 3.4 - 5.0 g/dL    Globulin 3.7 2.0 - 4.0 g/dL    A-G Ratio 1.0 0.8 - 1.7     CBC WITH AUTOMATED DIFF    Collection Time: 01/10/21  4:55 AM   Result Value Ref Range    WBC 5.9 4.6 - 13.2 K/uL    RBC 4.81 4.70 - 5.50 M/uL    HGB 13.3 13.0 - 16.0 g/dL    HCT 39.7 36.0 - 48.0 %    MCV 82.5 74.0 - 97.0 FL    MCH 27.7 24.0 - 34.0 PG    MCHC 33.5 31.0 - 37.0 g/dL    RDW 13.7 11.6 - 14.5 %    PLATELET 934 596 - 214 K/uL    MPV 10.0 9.2 - 11.8 FL    NEUTROPHILS 49 40 - 73 %    LYMPHOCYTES 35 21 - 52 %    MONOCYTES 12 (H) 3 - 10 %    EOSINOPHILS 4 0 - 5 %    BASOPHILS 0 0 - 2 %    ABS. NEUTROPHILS 2.9 1.8 - 8.0 K/UL    ABS. LYMPHOCYTES 2.1 0.9 - 3.6 K/UL    ABS. MONOCYTES 0.7 0.05 - 1.2 K/UL    ABS. EOSINOPHILS 0.2 0.0 - 0.4 K/UL    ABS. BASOPHILS 0.0 0.0 - 0.1 K/UL    DF AUTOMATED     GLUCOSE, POC    Collection Time: 01/10/21  8:16 AM   Result Value Ref Range    Glucose (POC) 136 (H) 70 - 110 mg/dL   GLUCOSE, POC    Collection Time: 01/10/21 11:35 AM   Result Value Ref Range    Glucose (POC) 165 (H) 70 - 110 mg/dL     Additional Data Reviewed:     Condition on discharge:stable   Disposition:    [x]Home   []Home with Home Health   []SNF/NH   []Rehab   []Home with family   []Alternate Facility:____________________      Discharge Medications:     Current Discharge Medication List      CONTINUE these medications which have NOT CHANGED    Details   omeprazole (PRILOSEC) 20 mg capsule Take 20 mg by mouth daily. simvastatin (ZOCOR) 20 mg tablet Take  by mouth nightly. chlorthalidone (HYGROTEN) 25 mg tablet Take  by mouth daily. potassium chloride SR (Klor-Con 10) 10 mEq tablet Take 10 mEq by mouth daily. cholecalciferol (Vitamin D3) 25 mcg (1,000 unit) cap Take 1,000 Units by mouth daily. aspirin delayed-release 81 mg tablet Take 81 mg by mouth daily. dicyclomine (BENTYL) 10 mg capsule Take 10 mg by mouth 4 times daily (before meals and nightly).       HYDROcodone-acetaminophen (Emmie Akins) 5-325 mg per tablet Take 1-2 tablets PO every 4-6 hours as needed for pain control. If over the counter ibuprofen or acetaminophen was suggested, then only take the vicodin for pain not well controlled with the over the counter medication. Qty: 12 Tab, Refills: 0      cyclobenzaprine (FLEXERIL) 5 mg tablet Take 1 Tab by mouth three (3) times daily as needed for Muscle Spasm(s). Qty: 12 Tab, Refills: 0      metFORMIN (GLUCOPHAGE) 500 mg tablet Take 1 Tab by mouth two (2) times daily (with meals). Qty: 20 Tab, Refills: 0      ergocalciferol (VITAMIN D2) 50,000 unit capsule Take 50,000 Units by mouth every seven (7) days. tadalafil (CIALIS) 20 mg tablet Take 20 mg by mouth as needed for 6 doses. Qty: 6 Tab, Refills: 3      diltiazem hcl 240 mg Tb24 Take 1 Tab by mouth daily. Follow-up Appointments:   1. Your PCP: Tylor Newton MD, within 7-10days  2. Cardiology in 7-10 days.     >30 minutes spent coordinating this discharge (review instructions/follow-up, prescriptions, preparing report for sign off)    Signed:  Margarita Minor MD  1/10/2021  2:14 PM

## 2021-01-10 NOTE — PROGRESS NOTES
Problem: Anxiety  Goal: *Alleviation of anxiety  Outcome: Progressing Towards Goal  Goal: *Alleviation of anxiety (Palliative Care)  Outcome: Progressing Towards Goal     Problem: Patient Education: Go to Patient Education Activity  Goal: Patient/Family Education  Outcome: Progressing Towards Goal

## 2021-01-10 NOTE — DISCHARGE INSTRUCTIONS
Return to ED if you pass out or feel like you are passing out, if you have chest pains, worsening palpitations. Please make sure you follow up with Dr Nick Sterling, cardiologist, in one week. His office address and phone number is below.     [address] [facility]

## 2021-01-10 NOTE — PROGRESS NOTES
D/C order noted for today. Orders reviewed. No needs identified at this time. Per pt, his wife will be picking him up.         Tania Peralta, BSN RN  Care Management  Pager: [phone]

## 2021-01-11 LAB
ECHO AO ASC DIAM: 3.59 CM
ECHO AO ROOT DIAM: 2.94 CM
ECHO LA AREA 4C: 14.96 CM2
ECHO LA MAJOR AXIS: 2.81 CM
ECHO LA MINOR AXIS: 1.47 CM
ECHO LA VOL 4C: 31.65 ML (ref 18–58)
ECHO LA VOLUME INDEX A4C: 16.55 ML/M2 (ref 16–28)
ECHO LV INTERNAL DIMENSION DIASTOLIC: 3.98 CM (ref 4.2–5.9)
ECHO LV INTERNAL DIMENSION SYSTOLIC: 2.5 CM
ECHO LV IVSD: 1.42 CM (ref 0.6–1)
ECHO LV MASS 2D: 207.5 G (ref 88–224)
ECHO LV MASS INDEX 2D: 108.5 G/M2 (ref 49–115)
ECHO LV POSTERIOR WALL DIASTOLIC: 1.38 CM (ref 0.6–1)
ECHO MV A VELOCITY: 70.67 CM/S
ECHO MV E DECELERATION TIME (DT): 172.74 MS
ECHO MV E VELOCITY: 61.95 CM/S
ECHO MV E/A RATIO: 0.88

## 2021-02-04 ENCOUNTER — OFFICE VISIT (OUTPATIENT)
Dept: CARDIOLOGY CLINIC | Age: 71
End: 2021-02-04
Payer: MEDICARE

## 2021-02-04 VITALS
WEIGHT: 181 LBS | BODY MASS INDEX: 29.09 KG/M2 | SYSTOLIC BLOOD PRESSURE: 140 MMHG | OXYGEN SATURATION: 96 % | TEMPERATURE: 98.8 F | HEIGHT: 66 IN | HEART RATE: 91 BPM | DIASTOLIC BLOOD PRESSURE: 70 MMHG

## 2021-02-04 DIAGNOSIS — E11.9 TYPE 2 DIABETES MELLITUS WITHOUT COMPLICATION, UNSPECIFIED WHETHER LONG TERM INSULIN USE (HCC): ICD-10-CM

## 2021-02-04 DIAGNOSIS — I10 ESSENTIAL HYPERTENSION: ICD-10-CM

## 2021-02-04 DIAGNOSIS — E78.5 DYSLIPIDEMIA, GOAL LDL BELOW 100: ICD-10-CM

## 2021-02-04 DIAGNOSIS — I47.1 SVT (SUPRAVENTRICULAR TACHYCARDIA) (HCC): Primary | ICD-10-CM

## 2021-02-04 PROCEDURE — G8536 NO DOC ELDER MAL SCRN: HCPCS | Performed by: INTERNAL MEDICINE

## 2021-02-04 PROCEDURE — G8428 CUR MEDS NOT DOCUMENT: HCPCS | Performed by: INTERNAL MEDICINE

## 2021-02-04 PROCEDURE — 99214 OFFICE O/P EST MOD 30 MIN: CPT | Performed by: INTERNAL MEDICINE

## 2021-02-04 PROCEDURE — 1101F PT FALLS ASSESS-DOCD LE1/YR: CPT | Performed by: INTERNAL MEDICINE

## 2021-02-04 PROCEDURE — 1111F DSCHRG MED/CURRENT MED MERGE: CPT | Performed by: INTERNAL MEDICINE

## 2021-02-04 PROCEDURE — 3044F HG A1C LEVEL LT 7.0%: CPT | Performed by: INTERNAL MEDICINE

## 2021-02-04 PROCEDURE — G8419 CALC BMI OUT NRM PARAM NOF/U: HCPCS | Performed by: INTERNAL MEDICINE

## 2021-02-04 PROCEDURE — 2022F DILAT RTA XM EVC RTNOPTHY: CPT | Performed by: INTERNAL MEDICINE

## 2021-02-04 PROCEDURE — G8432 DEP SCR NOT DOC, RNG: HCPCS | Performed by: INTERNAL MEDICINE

## 2021-02-04 PROCEDURE — G8753 SYS BP > OR = 140: HCPCS | Performed by: INTERNAL MEDICINE

## 2021-02-04 PROCEDURE — 3017F COLORECTAL CA SCREEN DOC REV: CPT | Performed by: INTERNAL MEDICINE

## 2021-02-04 PROCEDURE — G8754 DIAS BP LESS 90: HCPCS | Performed by: INTERNAL MEDICINE

## 2021-02-11 NOTE — PROGRESS NOTES
HISTORY OF PRESENT ILLNESS  Migel Paget Glinda Matter is a 79 y.o. male. Palpitations   The history is provided by the patient. This is a new problem. The problem occurs rarely. Pertinent negatives include no diaphoresis, no fever, no chest pain, no claudication, no orthopnea, no PND, no abdominal pain, no nausea, no vomiting, no headaches, no dizziness, no weakness, no cough, no hemoptysis, no shortness of breath and no sputum production. His past medical history is significant for hypertension and SVT. Review of Systems   Constitutional: Negative for chills, diaphoresis, fever and weight loss. HENT: Negative for ear pain and hearing loss. Eyes: Negative for blurred vision. Respiratory: Negative for cough, hemoptysis, sputum production, shortness of breath, wheezing and stridor. Cardiovascular: Positive for palpitations. Negative for chest pain, orthopnea, claudication, leg swelling and PND. Gastrointestinal: Negative for abdominal pain, heartburn, nausea and vomiting. Musculoskeletal: Negative for myalgias and neck pain. Skin: Negative for rash. Neurological: Negative for dizziness, tingling, tremors, focal weakness, loss of consciousness, weakness and headaches. Psychiatric/Behavioral: Negative for depression and suicidal ideas. No family history on file. Past Medical History:   Diagnosis Date    Diabetes ([address]     Hypertension        Past Surgical History:   Procedure Laterality Date    HX CARPAL TUNNEL RELEASE         Social History     Tobacco Use    Smoking status: Former Smoker     Quit date:      Years since quittin.1    Smokeless tobacco: Never Used   Substance Use Topics    Alcohol use: No       No Known Allergies    Outpatient Medications Marked as Taking for the 21 encounter (Office Visit) with Lew Valencia MD   Medication Sig Dispense Refill    simvastatin (ZOCOR) 20 mg tablet Take  by mouth nightly.       chlorthalidone (HYGROTEN) 25 mg tablet Take  by mouth daily.  cholecalciferol (Vitamin D3) 25 mcg (1,000 unit) cap Take 1,000 Units by mouth daily.  aspirin delayed-release 81 mg tablet Take 81 mg by mouth daily.  metFORMIN (GLUCOPHAGE) 500 mg tablet Take 1 Tab by mouth two (2) times daily (with meals). 20 Tab 0    diltiazem hcl 240 mg Tb24 Take 1 Tab by mouth daily. Visit Vitals  BP (!) 140/70 (BP 1 Location: Left upper arm, BP Patient Position: Sitting, BP Cuff Size: Adult)   Pulse 91   Temp 98.8 °F (37.1 °C) (Temporal)   Ht 5' 6\" (1.676 m)   Wt 82.1 kg (181 lb)   SpO2 96%   BMI 29.21 kg/m²       Physical Exam   Constitutional: He is oriented to person, place, and time. He appears well-developed and well-nourished. HENT:   Head: Normocephalic and atraumatic. Eyes: Conjunctivae and EOM are normal. No scleral icterus. Neck: Normal range of motion. Neck supple. No JVD present. Cardiovascular: Normal rate, regular rhythm and normal heart sounds. Exam reveals no gallop and no friction rub. No murmur heard. Pulmonary/Chest: Effort normal and breath sounds normal. No stridor. No respiratory distress. He has no wheezes. He has no rales. He exhibits no tenderness. Abdominal: He exhibits no distension. There is no abdominal tenderness. Musculoskeletal: Normal range of motion. General: No tenderness or edema. Lymphadenopathy:     He has no cervical adenopathy. Neurological: He is alert and oriented to person, place, and time. No cranial nerve deficit. Skin: No rash noted. No erythema. Psychiatric: He has a normal mood and affect. His behavior is normal.     01/08/21   ECHO ADULT COMPLETE 01/11/2021 1/11/2021    Narrative · LV: Estimated LVEF is 55 - 60%. Visually measured ejection fraction. Normal cavity size and systolic function (ejection fraction normal). Mild   concentric hypertrophy. Mild (grade 1) left ventricular diastolic   dysfunction. · Insufficient TR to estimate pulmonary artery pressure. Signed by: Sadia Santos MD       ASSESSMENT and PLAN    ICD-10-CM ICD-9-CM    1. SVT (supraventricular tachycardia) (HCC)  I47.1 427.89    2. Essential hypertension  I10 401.9    3. Type 2 diabetes mellitus without complication, unspecified whether long term insulin use (HCC)  E11.9 250.00    4. Dyslipidemia, goal LDL below 100  E78.5 272.4      No orders of the defined types were placed in this encounter. Follow-up and Dispositions    · Return in about 6 months (around 8/4/2021). current treatment plan is effective, no change in therapy. Patient is a 79 yr old male with htn ,dm and dyslipidemia admitted with SVT- converted to NSR with adenosine. Echo - normal LV function. Currently on lopressor. No new symptoms. Continue current meds  F/u in 6 months.

## 2021-08-05 ENCOUNTER — OFFICE VISIT (OUTPATIENT)
Dept: CARDIOLOGY CLINIC | Age: 71
End: 2021-08-05
Payer: MEDICARE

## 2021-08-05 VITALS
OXYGEN SATURATION: 95 % | BODY MASS INDEX: 29.57 KG/M2 | SYSTOLIC BLOOD PRESSURE: 130 MMHG | HEIGHT: 66 IN | WEIGHT: 184 LBS | DIASTOLIC BLOOD PRESSURE: 64 MMHG | HEART RATE: 90 BPM

## 2021-08-05 DIAGNOSIS — I47.1 SVT (SUPRAVENTRICULAR TACHYCARDIA) (HCC): Primary | ICD-10-CM

## 2021-08-05 DIAGNOSIS — E11.9 TYPE 2 DIABETES MELLITUS WITHOUT COMPLICATION, UNSPECIFIED WHETHER LONG TERM INSULIN USE (HCC): ICD-10-CM

## 2021-08-05 DIAGNOSIS — E78.5 DYSLIPIDEMIA, GOAL LDL BELOW 100: ICD-10-CM

## 2021-08-05 DIAGNOSIS — I10 ESSENTIAL HYPERTENSION: ICD-10-CM

## 2021-08-05 PROCEDURE — 2022F DILAT RTA XM EVC RTNOPTHY: CPT | Performed by: INTERNAL MEDICINE

## 2021-08-05 PROCEDURE — G8428 CUR MEDS NOT DOCUMENT: HCPCS | Performed by: INTERNAL MEDICINE

## 2021-08-05 PROCEDURE — G8752 SYS BP LESS 140: HCPCS | Performed by: INTERNAL MEDICINE

## 2021-08-05 PROCEDURE — 99214 OFFICE O/P EST MOD 30 MIN: CPT | Performed by: INTERNAL MEDICINE

## 2021-08-05 PROCEDURE — G8536 NO DOC ELDER MAL SCRN: HCPCS | Performed by: INTERNAL MEDICINE

## 2021-08-05 PROCEDURE — 3044F HG A1C LEVEL LT 7.0%: CPT | Performed by: INTERNAL MEDICINE

## 2021-08-05 PROCEDURE — 3017F COLORECTAL CA SCREEN DOC REV: CPT | Performed by: INTERNAL MEDICINE

## 2021-08-05 PROCEDURE — G8419 CALC BMI OUT NRM PARAM NOF/U: HCPCS | Performed by: INTERNAL MEDICINE

## 2021-08-05 PROCEDURE — G8754 DIAS BP LESS 90: HCPCS | Performed by: INTERNAL MEDICINE

## 2021-08-05 PROCEDURE — 1101F PT FALLS ASSESS-DOCD LE1/YR: CPT | Performed by: INTERNAL MEDICINE

## 2021-08-05 PROCEDURE — G8432 DEP SCR NOT DOC, RNG: HCPCS | Performed by: INTERNAL MEDICINE

## 2021-08-05 RX ORDER — GLIMEPIRIDE 2 MG/1
TABLET ORAL
COMMUNITY
Start: 2021-07-14

## 2021-08-05 RX ORDER — VALSARTAN 80 MG/1
TABLET ORAL
COMMUNITY
Start: 2021-07-14

## 2021-08-05 NOTE — PROGRESS NOTES
1. Have you been to the ER, urgent care clinic since your last visit? Hospitalized since your last visit? No    2. Have you seen or consulted any other health care providers outside of the [address] [place] [address] since your last visit? Include any pap smears or colon screening.  Yes PCP Routine

## 2021-08-05 NOTE — PROGRESS NOTES
HISTORY OF PRESENT ILLNESS  Ally Chandra is a 79 y.o. male. Palpitations   The history is provided by the patient. This is a chronic problem. The problem occurs rarely. Pertinent negatives include no diaphoresis, no fever, no claudication, no orthopnea, no PND, no nausea, no vomiting, no dizziness, no weakness, no cough, no hemoptysis and no sputum production. His past medical history is significant for hypertension and SVT. Review of Systems   Constitutional: Negative for chills, diaphoresis, fever and weight loss. HENT: Negative for ear pain and hearing loss. Eyes: Negative for blurred vision. Respiratory: Negative for cough, hemoptysis, sputum production, wheezing and stridor. Cardiovascular: Positive for palpitations. Negative for orthopnea, claudication, leg swelling and PND. Gastrointestinal: Negative for heartburn, nausea and vomiting. Musculoskeletal: Negative for myalgias and neck pain. Skin: Negative for rash. Neurological: Negative for dizziness, tingling, tremors, focal weakness, loss of consciousness and weakness. Psychiatric/Behavioral: Negative for depression and suicidal ideas. No family history on file. Past Medical History:   Diagnosis Date    [address] [address]     Hypertension        Past Surgical History:   Procedure Laterality Date    HX CARPAL TUNNEL RELEASE         Social History     Tobacco Use    Smoking status: Former Smoker     Quit date:      Years since quittin.7    Smokeless tobacco: Never Used   Substance Use Topics    Alcohol use: No       No Known Allergies    Outpatient Medications Marked as Taking for the 21 encounter (Office Visit) with Alma Rudolph MD   Medication Sig Dispense Refill    simvastatin (ZOCOR) 20 mg tablet Take  by mouth nightly.  chlorthalidone (HYGROTEN) 25 mg tablet Take  by mouth daily.  cholecalciferol (Vitamin D3) 25 mcg (1,000 unit) cap Take 1,000 Units by mouth daily.       aspirin delayed-release 81 mg tablet Take 81 mg by mouth daily.  dicyclomine (BENTYL) 10 mg capsule Take 10 mg by mouth 4 times daily (before meals and nightly).  metFORMIN (GLUCOPHAGE) 500 mg tablet Take 1 Tab by mouth two (2) times daily (with meals). 20 Tab 0    diltiazem hcl 240 mg Tb24 Take 1 Tab by mouth daily. Visit Vitals  /64 (BP 1 Location: Left upper arm, BP Patient Position: Sitting, BP Cuff Size: Adult)   Pulse 90   Ht 5' 6\" (1.676 m)   Wt 83.5 kg (184 lb)   SpO2 95%   BMI 29.70 kg/m²       Physical Exam  Constitutional:       Appearance: He is well-developed. HENT:      Head: Normocephalic and atraumatic. Eyes:      General: No scleral icterus. Conjunctiva/sclera: Conjunctivae normal.   Neck:      Vascular: No JVD. Cardiovascular:      Rate and Rhythm: Normal rate and regular rhythm. Heart sounds: Normal heart sounds. No murmur heard. No friction rub. No gallop. Pulmonary:      Effort: Pulmonary effort is normal. No respiratory distress. Breath sounds: Normal breath sounds. No stridor. No wheezing or rales. Chest:      Chest wall: No tenderness. Abdominal:      General: There is no distension. Tenderness: There is no abdominal tenderness. Musculoskeletal:         General: No tenderness. Normal range of motion. Cervical back: Normal range of motion and neck supple. Lymphadenopathy:      Cervical: No cervical adenopathy. Skin:     Findings: No erythema or rash. Neurological:      Mental Status: He is alert and oriented to person, place, and time. Cranial Nerves: No cranial nerve deficit. Psychiatric:         Behavior: Behavior normal.       01/08/21   ECHO ADULT COMPLETE 01/11/2021 1/11/2021    Narrative · LV: Estimated LVEF is 55 - 60%. Visually measured ejection fraction. Normal cavity size and systolic function (ejection fraction normal). Mild   concentric hypertrophy. Mild (grade 1) left ventricular diastolic   dysfunction.   · Insufficient TR to estimate pulmonary artery pressure. Signed by: Mikey Valenzuela MD       ASSESSMENT and PLAN    ICD-10-CM ICD-9-CM    1. SVT (supraventricular tachycardia) (HCC)  I47.1 427.89    2. Essential hypertension  I10 401.9    3. Type 2 diabetes mellitus without complication, unspecified whether long term insulin use (HCC)  E11.9 250.00    4. Dyslipidemia, goal LDL below 100  E78.5 272.4      No orders of the defined types were placed in this encounter. Follow-up and Dispositions    · Return in about 9 months (around 5/5/2022). current treatment plan is effective, no change in therapy. Patient is a 79 yr old male with htn ,dm and dyslipidemia admitted with SVT- converted to NSR with adenosine. Echo - normal LV function. Currently on diltiazem. No new symptoms. Continue current meds  F/u in 9 months.

## 2021-11-29 NOTE — CONSULTS
[facility] - Consult Note    Date of  Admission: 1/8/2021 12:36 PM     Primary Care Physician:  Sienna Borges MD     Plan:     1) SVT - per chart 1 episode- resolved with adenosine. TSH/ electrolytes wnl. Continue lopressor. 2) htn  3) dyslipidemia    Will obtain echo. Serial cardiac enzyme- if elevated, will consider cardiac w/u                         XR Results (most recent):  Results from Hospital Encounter encounter on 01/08/21   XR CHEST PORT    Narrative INDICATION: chest pain, tachycardia    TECHNIQUE: Portable chest radiograph    COMPARISON: 3 January 2016    FINDINGS: The lungs are adequately inflated. Right mid lung linear atelectasis  or scarring. No focal consolidation, effusion or pneumothorax. Heart size within  normal limits. No acute osseous abnormality. Impression IMPRESSION:     No acute cardiopulmonary findings            Assessment:     Hospital Problems  Date Reviewed: 9/23/2020          Codes Class Noted POA    SVT (supraventricular tachycardia) ([facility]) ICD-10-CM: I47.1  ICD-9-CM: 427.89  1/8/2021 Unknown                   History of Present Illness: This is a 79 y.o. male admitted for SVT (supraventricular tachycardia) ([facility] [address] [I47.1]. Patient complains of:      Rosa Valladares is a 79 y.o. male who is being admitted to the hospital for SVT. Patient mentions he came home today and noted to have significant palpitations. He said he had similar symptoms once in the past.  Patient mentions he has been history of hypertension, type 2 diabetes, hyperlipidemia and has been compliant with his medications. ER evaluationpatient noted to have SVTgiven adenosine 6 mg with improvement of heart rate. This was followed by Cardizem 5 mg IVP and 2.5 mg IVP. Patient's heart rate has improved, cardiology consulted from ER and advised admission for further evaluation.   Patient is currently not having any palpitations, no chest pain, no shortness of Marvel Valdovinos    Dear Ms. Sammi Caro    Thank you for requesting an ultrasound and maternal-fetal medicine consultation on your patient Mata Angela.   As you are aware she is a 44year old female  with a ponce pregnan breath. Cardiac risk factors: htn         Past Medical History:     Past Medical History:   Diagnosis Date    Diabetes ([facility] [address]     Hypertension          Social History:     Social History     Socioeconomic History    Marital status:      Spouse name: Not on file    Number of children: Not on file    Years of education: Not on file    Highest education level: Not on file   Tobacco Use    Smoking status: Former Smoker    Smokeless tobacco: Never Used   Substance and Sexual Activity    Alcohol use: No    Drug use: No        Family History:   No family history on file. Medications:   No Known Allergies     Current Facility-Administered Medications   Medication Dose Route Frequency    sodium chloride (NS) flush 5-40 mL  5-40 mL IntraVENous Q8H    sodium chloride (NS) flush 5-40 mL  5-40 mL IntraVENous PRN    acetaminophen (TYLENOL) tablet 650 mg  650 mg Oral Q6H PRN    Or    acetaminophen (TYLENOL) suppository 650 mg  650 mg Rectal Q6H PRN    polyethylene glycol (MIRALAX) packet 17 g  17 g Oral DAILY PRN    ondansetron (ZOFRAN ODT) tablet 4 mg  4 mg Oral Q8H PRN    Or    ondansetron (ZOFRAN) injection 4 mg  4 mg IntraVENous Q6H PRN    [START ON 1/9/2021] enoxaparin (LOVENOX) injection 40 mg  40 mg SubCUTAneous DAILY    metoprolol tartrate (LOPRESSOR) tablet 25 mg  25 mg Oral Q12H     Current Outpatient Medications   Medication Sig    omeprazole (PRILOSEC) 20 mg capsule Take 20 mg by mouth daily.  simvastatin (ZOCOR) 20 mg tablet Take  by mouth nightly.  chlorthalidone (HYGROTEN) 25 mg tablet Take  by mouth daily.  potassium chloride SR (Klor-Con 10) 10 mEq tablet Take 10 mEq by mouth daily.  cholecalciferol (Vitamin D3) 25 mcg (1,000 unit) cap Take 1,000 Units by mouth daily.  aspirin delayed-release 81 mg tablet Take 81 mg by mouth daily.  dicyclomine (BENTYL) 10 mg capsule Take 10 mg by mouth 4 times daily (before meals and nightly).     free fetal DNA, declined invasive testing  · H/O thrombocytopenia    RECOMMENDATIONS:  · Continue care with Ms. Seay  Weekly NST's at 36 weeks  · Repeat platelet count at 36 weeks  · I encouraged the patient to obtain a COVID-19 vaccination booster    Cameron Ruggiero HYDROcodone-acetaminophen (NORCO) 5-325 mg per tablet Take 1-2 tablets PO every 4-6 hours as needed for pain control. If over the counter ibuprofen or acetaminophen was suggested, then only take the vicodin for pain not well controlled with the over the counter medication.  cyclobenzaprine (FLEXERIL) 5 mg tablet Take 1 Tab by mouth three (3) times daily as needed for Muscle Spasm(s).  metFORMIN (GLUCOPHAGE) 500 mg tablet Take 1 Tab by mouth two (2) times daily (with meals).  ergocalciferol (VITAMIN D2) 50,000 unit capsule Take 50,000 Units by mouth every seven (7) days.  tadalafil (CIALIS) 20 mg tablet Take 20 mg by mouth as needed for 6 doses.  diltiazem hcl 240 mg Tb24 Take 1 Tab by mouth daily. Review Of Systems:       A comprehensive review of systems was negative except for that written in the HPI.     Constitutional: No fever, no chills, no weight loss, no night sweats   HEENT: No epistaxis, no nasal drainage, no difficulty in swallowing, no redness in eyes  Respiratory: negative for cough, sputum, hemoptysis, pleurisy/chest pain, wheezing, dyspnea on exertion or emphysema  Cardiovascular: no chest pain, no chest pressure, no dyspnea, no pnd, no claudication no palpitations, no chronic leg edema, no syncope  Gastrointestinal: no abd pain, no vomiting, no diarrhea, no bleeding symptoms  Genitourinary: No urinary symptoms or hematuria  Integument/breast: No ulcers or rashes  Musculoskeletal: no muscle pain, no weakness  Neurological: No focal weakness, no seizures, no headaches  Behvioral/Psych: No anxiety, no depression             Physical Exam:     Visit Vitals  /77 (BP 1 Location: Right arm)   Pulse 76   Temp 98.1 °F (36.7 °C)   Resp 17   Ht 5' 6\" (1.676 m)   Wt 81.6 kg (180 lb)   SpO2 96%   BMI 29.05 kg/m²     BP Readings from Last 3 Encounters:   01/08/21 124/77   09/23/20 (!) 144/76   09/16/20 (!) 141/82     Pulse Readings from Last 3 Encounters:   01/08/21 76   09/23/20 71 09/16/20 85     Wt Readings from Last 3 Encounters:   01/08/21 81.6 kg (180 lb)   09/23/20 80.7 kg (178 lb)   09/16/20 81.2 kg (179 lb)       General:  alert, cooperative, no distress, appears stated age  Skin: Warm and dry, acyanotic, normal color. Head: Normocephalic, atraumatic. Eyes: Sclerae anicteric, conjunctivae without injection. Neck:  nontender, no nuchal rigidity, no masses, no stridor, no carotid bruit, no JVD  Lungs:  clear to auscultation bilaterally, no rhonchi  Heart:  regular rate and rhythm, S1, S2 normal, no murmur, click, rub or gallop  Abdomen:  abdomen is soft without significant tenderness, masses, organomegaly or guarding  Extremities:  extremities normal, atraumatic, no cyanosis or edema. Peripheral pulses   Neurological: grossly intact. No focal abnormalities, moves all extremities well. Psychiatric Affect: The patient is awake, alert and oriented x3. Katerina Hewitt is interactive and appropriate. Data Review:     Recent Results (from the past 48 hour(s))   METABOLIC PANEL, COMPREHENSIVE    Collection Time: 01/08/21 11:29 AM   Result Value Ref Range    Sodium 139 136 - 145 mmol/L    Potassium 3.6 3.5 - 5.5 mmol/L    Chloride 101 100 - 111 mmol/L    CO2 27 21 - 32 mmol/L    Anion gap 11 3.0 - 18 mmol/L    Glucose 162 (H) 74 - 99 mg/dL    BUN 17 7.0 - 18 MG/DL    Creatinine 1.31 (H) 0.6 - 1.3 MG/DL    BUN/Creatinine ratio 13 12 - 20      GFR est AA >60 >60 ml/min/1.73m2    GFR est non-AA 54 (L) >60 ml/min/1.73m2    Calcium 9.4 8.5 - 10.1 MG/DL    Bilirubin, total 0.8 0.2 - 1.0 MG/DL    ALT (SGPT) 71 (H) 16 - 61 U/L    AST (SGOT) 25 10 - 38 U/L    Alk.  phosphatase 87 45 - 117 U/L    Protein, total 8.8 (H) 6.4 - 8.2 g/dL    Albumin 4.1 3.4 - 5.0 g/dL    Globulin 4.7 (H) 2.0 - 4.0 g/dL    A-G Ratio 0.9 0.8 - 1.7     CBC WITH AUTOMATED DIFF    Collection Time: 01/08/21 11:29 AM   Result Value Ref Range    WBC 8.8 4.6 - 13.2 K/uL    RBC 5.15 4.70 - 5.50 M/uL    HGB 14.7 13.0 - 16.0 g/dL HCT 42.2 36.0 - 48.0 %    MCV 81.9 74.0 - 97.0 FL    MCH 28.5 24.0 - 34.0 PG    MCHC 34.8 31.0 - 37.0 g/dL    RDW 13.6 11.6 - 14.5 %    PLATELET 936 486 - 028 K/uL    MPV 9.7 9.2 - 11.8 FL    NEUTROPHILS 29 (L) 42 - 75 %    LYMPHOCYTES 51 20 - 51 %    MONOCYTES 16 (H) 2 - 9 %    EOSINOPHILS 4 0 - 5 %    BASOPHILS 0 0 - 3 %    ABS. NEUTROPHILS 2.6 1.8 - 8.0 K/UL    ABS. LYMPHOCYTES 4.4 (H) 0.8 - 3.5 K/UL    ABS. MONOCYTES 1.4 (H) 0 - 1.0 K/UL    ABS. EOSINOPHILS 0.4 0.0 - 0.4 K/UL    ABS. BASOPHILS 0.0 0.0 - 0.06 K/UL    DF MANUAL      PLATELET COMMENTS ADEQUATE PLATELETS      RBC COMMENTS NORMOCYTIC, NORMOCHROMIC     CARDIAC PANEL,(CK, CKMB & TROPONIN)    Collection Time: 01/08/21 11:29 AM   Result Value Ref Range    CK - MB <1.0 <3.6 ng/ml    CK-MB Index  0.0 - 4.0 %     CALCULATION NOT PERFORMED WHEN RESULT IS BELOW LINEAR LIMIT    CK 41 39 - 308 U/L    Troponin-I, QT <0.02 0.0 - 0.045 NG/ML   NT-PRO BNP    Collection Time: 01/08/21 11:29 AM   Result Value Ref Range    NT pro-BNP 10 0 - 900 PG/ML   MAGNESIUM    Collection Time: 01/08/21 11:29 AM   Result Value Ref Range    Magnesium 2.2 1.6 - 2.6 mg/dL   EKG, 12 LEAD, INITIAL    Collection Time: 01/08/21 12:43 PM   Result Value Ref Range    Ventricular Rate 118 BPM    Atrial Rate 118 BPM    P-R Interval 172 ms    QRS Duration 92 ms    Q-T Interval 318 ms    QTC Calculation (Bezet) 445 ms    Calculated P Axis 61 degrees    Calculated R Axis 67 degrees    Calculated T Axis 58 degrees    Diagnosis       Sinus tachycardia  Nonspecific ST abnormality  Abnormal ECG  When compared with ECG of 05-JAN-2016 09:16,  Vent.  rate has increased BY  42 BPM  Confirmed by Aixa Mario MD, Karthik Rod [id number]) on 1/8/2021 6:15:39 PM     URINALYSIS W/ RFLX MICROSCOPIC    Collection Time: 01/08/21  7:00 PM   Result Value Ref Range    Color YELLOW      Appearance CLEAR      Specific gravity 1.008 1.005 - 1.030      pH (UA) 8.0 5.0 - 8.0      Protein Negative NEG mg/dL    Glucose Negative NEG mg/dL    Ketone Negative NEG mg/dL    Bilirubin Negative NEG      Blood Negative NEG      Urobilinogen 0.2 0.2 - 1.0 EU/dL    Nitrites Negative NEG      Leukocyte Esterase Negative NEG           Intake/Output Summary (Last 24 hours) at 1/8/2021 2145  Last data filed at 1/8/2021 2133  Gross per 24 hour   Intake 1000 ml   Output    Net 1000 ml       Cardiographics:       EKG Results     Procedure 720 Value Units Date/Time    EKG, 12 LEAD, INITIAL [[id number]] Collected: 01/08/21 1243    Order Status: Completed Updated: 01/08/21 1815     Ventricular Rate 118 BPM      Atrial Rate 118 BPM      P-R Interval 172 ms      QRS Duration 92 ms      Q-T Interval 318 ms      QTC Calculation (Bezet) 445 ms      Calculated P Axis 61 degrees      Calculated R Axis 67 degrees      Calculated T Axis 58 degrees      Diagnosis --     Sinus tachycardia  Nonspecific ST abnormality  Abnormal ECG  When compared with ECG of 05-JAN-2016 09:16,  Vent.  rate has increased BY  42 BPM  Confirmed by Kel Muñoz MD, Kel Andre ([id number]) on 1/8/2021 6:15:39 PM                 Signed By: Manuel Campos MD    January 8, 2021

## 2022-03-18 PROBLEM — I47.10 SVT (SUPRAVENTRICULAR TACHYCARDIA): Status: ACTIVE | Noted: 2021-01-08

## 2022-03-18 PROBLEM — I47.1 SVT (SUPRAVENTRICULAR TACHYCARDIA) (HCC): Status: ACTIVE | Noted: 2021-01-08

## 2022-05-03 ENCOUNTER — OFFICE VISIT (OUTPATIENT)
Dept: CARDIOLOGY CLINIC | Age: 72
End: 2022-05-03
Payer: MEDICARE

## 2022-05-03 VITALS
SYSTOLIC BLOOD PRESSURE: 135 MMHG | BODY MASS INDEX: 30.37 KG/M2 | DIASTOLIC BLOOD PRESSURE: 58 MMHG | WEIGHT: 189 LBS | OXYGEN SATURATION: 98 % | HEART RATE: 78 BPM | HEIGHT: 66 IN

## 2022-05-03 DIAGNOSIS — E78.5 DYSLIPIDEMIA, GOAL LDL BELOW 100: ICD-10-CM

## 2022-05-03 DIAGNOSIS — E11.9 TYPE 2 DIABETES MELLITUS WITHOUT COMPLICATION, UNSPECIFIED WHETHER LONG TERM INSULIN USE (HCC): ICD-10-CM

## 2022-05-03 DIAGNOSIS — I47.1 SVT (SUPRAVENTRICULAR TACHYCARDIA) (HCC): Primary | ICD-10-CM

## 2022-05-03 DIAGNOSIS — I10 ESSENTIAL HYPERTENSION: ICD-10-CM

## 2022-05-03 PROCEDURE — G8417 CALC BMI ABV UP PARAM F/U: HCPCS | Performed by: INTERNAL MEDICINE

## 2022-05-03 PROCEDURE — 3017F COLORECTAL CA SCREEN DOC REV: CPT | Performed by: INTERNAL MEDICINE

## 2022-05-03 PROCEDURE — 3046F HEMOGLOBIN A1C LEVEL >9.0%: CPT | Performed by: INTERNAL MEDICINE

## 2022-05-03 PROCEDURE — 1101F PT FALLS ASSESS-DOCD LE1/YR: CPT | Performed by: INTERNAL MEDICINE

## 2022-05-03 PROCEDURE — G8752 SYS BP LESS 140: HCPCS | Performed by: INTERNAL MEDICINE

## 2022-05-03 PROCEDURE — G8536 NO DOC ELDER MAL SCRN: HCPCS | Performed by: INTERNAL MEDICINE

## 2022-05-03 PROCEDURE — G8754 DIAS BP LESS 90: HCPCS | Performed by: INTERNAL MEDICINE

## 2022-05-03 PROCEDURE — 99214 OFFICE O/P EST MOD 30 MIN: CPT | Performed by: INTERNAL MEDICINE

## 2022-05-03 PROCEDURE — 93000 ELECTROCARDIOGRAM COMPLETE: CPT | Performed by: INTERNAL MEDICINE

## 2022-05-03 PROCEDURE — 2022F DILAT RTA XM EVC RTNOPTHY: CPT | Performed by: INTERNAL MEDICINE

## 2022-05-03 PROCEDURE — G8428 CUR MEDS NOT DOCUMENT: HCPCS | Performed by: INTERNAL MEDICINE

## 2022-05-03 PROCEDURE — G8432 DEP SCR NOT DOC, RNG: HCPCS | Performed by: INTERNAL MEDICINE

## 2022-05-03 NOTE — PROGRESS NOTES
1. Have you been to the ER, urgent care clinic since your last visit? Hospitalized since your last visit?     no  2. Have you seen or consulted any other health care providers outside of the [address] [place] [address] since your last visit? Include any pap smears or colon screening.       Yes Where: no

## 2022-05-03 NOTE — PROGRESS NOTES
HISTORY OF PRESENT ILLNESS  [facility] Jahaira Bucio is a 70 y.o. male. Palpitations   The history is provided by the patient. This is a chronic problem. The problem occurs rarely. Pertinent negatives include no diaphoresis, no fever, no claudication, no orthopnea, no PND, no nausea, no vomiting, no dizziness, no weakness, no cough, no hemoptysis and no sputum production. His past medical history is significant for hypertension and SVT. Review of Systems   Constitutional: Negative for chills, diaphoresis, fever and weight loss. HENT: Negative for ear pain and hearing loss. Eyes: Negative for blurred vision. Respiratory: Negative for cough, hemoptysis, sputum production, wheezing and stridor. Cardiovascular: Positive for palpitations. Negative for orthopnea, claudication, leg swelling and PND. Gastrointestinal: Negative for heartburn, nausea and vomiting. Musculoskeletal: Negative for myalgias and neck pain. Skin: Negative for rash. Neurological: Negative for dizziness, tingling, tremors, focal weakness, loss of consciousness and weakness. Psychiatric/Behavioral: Negative for depression and suicidal ideas. No family history on file. Past Medical History:   Diagnosis Date    Diabetes ([facility] [address]     Hypertension        Past Surgical History:   Procedure Laterality Date    HX CARPAL TUNNEL RELEASE         Social History     Tobacco Use    Smoking status: Former Smoker     Quit date:      Years since quittin.3    Smokeless tobacco: Never Used   Substance Use Topics    Alcohol use: No       No Known Allergies    Outpatient Medications Marked as Taking for the 5/3/22 encounter (Office Visit) with Pat Wilhelm MD   Medication Sig Dispense Refill    glimepiride (AMARYL) 2 mg tablet       valsartan (DIOVAN) 80 mg tablet       simvastatin (ZOCOR) 20 mg tablet Take  by mouth nightly.  chlorthalidone (HYGROTEN) 25 mg tablet Take  by mouth daily.       cholecalciferol (Vitamin D3) 25 mcg (1,000 unit) cap Take 1,000 Units by mouth daily.  aspirin delayed-release 81 mg tablet Take 81 mg by mouth daily.  metFORMIN (GLUCOPHAGE) 500 mg tablet Take 1 Tab by mouth two (2) times daily (with meals). 20 Tab 0    diltiazem hcl 240 mg Tb24 Take 1 Tab by mouth daily. Visit Vitals  BP (!) 135/58   Pulse 78   Ht 5' 6\" (1.676 m)   Wt 85.7 kg (189 lb)   SpO2 98%   BMI 30.51 kg/m²       Physical Exam  Constitutional:       Appearance: He is well-developed. HENT:      Head: Normocephalic and atraumatic. Eyes:      General: No scleral icterus. Conjunctiva/sclera: Conjunctivae normal.   Neck:      Vascular: No JVD. Cardiovascular:      Rate and Rhythm: Normal rate and regular rhythm. Heart sounds: Normal heart sounds. No murmur heard. No friction rub. No gallop. Pulmonary:      Effort: Pulmonary effort is normal. No respiratory distress. Breath sounds: Normal breath sounds. No stridor. No wheezing or rales. Chest:      Chest wall: No tenderness. Abdominal:      General: There is no distension. Tenderness: There is no abdominal tenderness. Musculoskeletal:         General: No tenderness. Normal range of motion. Cervical back: Normal range of motion and neck supple. Lymphadenopathy:      Cervical: No cervical adenopathy. Skin:     Findings: No erythema or rash. Neurological:      Mental Status: He is alert and oriented to person, place, and time. Cranial Nerves: No cranial nerve deficit. Psychiatric:         Behavior: Behavior normal.       01/08/21   ECHO ADULT COMPLETE 01/11/2021 1/11/2021    Narrative · LV: Estimated LVEF is 55 - 60%. Visually measured ejection fraction. Normal cavity size and systolic function (ejection fraction normal). Mild   concentric hypertrophy. Mild (grade 1) left ventricular diastolic   dysfunction. · Insufficient TR to estimate pulmonary artery pressure.         Signed by: Valerie Mata MD ASSESSMENT and PLAN    ICD-10-CM ICD-9-CM    1. SVT (supraventricular tachycardia) (HCC)  I47.1 427.89 AMB POC EKG ROUTINE W/ 12 LEADS, INTER & REP   2. Essential hypertension  I10 401.9    3. Type 2 diabetes mellitus without complication, unspecified whether long term insulin use (HCC)  E11.9 250.00    4. Dyslipidemia, goal LDL below 100  E78.5 272.4      Orders Placed This Encounter    AMB POC EKG ROUTINE W/ 12 LEADS, INTER & REP     Order Specific Question:   Reason for Exam:     Answer:   SVT     Follow-up and Dispositions    · Return in about 1 year (around 5/3/2023). current treatment plan is effective, no change in therapy. Patient is a 79 yr old male with htn ,dm and dyslipidemia admitted with SVT- converted to NSR with adenosine. Echo - normal LV function. Currently on diltiazem. No breakthrough symptoms. Effort tolerance is excellent. Continue current medications and follow-up in 1 year.

## 2023-05-04 ENCOUNTER — OFFICE VISIT (OUTPATIENT)
Age: 73
End: 2023-05-04

## 2023-05-04 VITALS
HEIGHT: 66 IN | SYSTOLIC BLOOD PRESSURE: 145 MMHG | BODY MASS INDEX: 30.7 KG/M2 | HEART RATE: 80 BPM | DIASTOLIC BLOOD PRESSURE: 73 MMHG | OXYGEN SATURATION: 95 % | WEIGHT: 191 LBS

## 2023-05-04 DIAGNOSIS — I10 ESSENTIAL (PRIMARY) HYPERTENSION: ICD-10-CM

## 2023-05-04 DIAGNOSIS — E11.9 TYPE 2 DIABETES MELLITUS WITHOUT COMPLICATION, WITHOUT LONG-TERM CURRENT USE OF INSULIN (HCC): ICD-10-CM

## 2023-05-04 DIAGNOSIS — I47.1 SUPRAVENTRICULAR TACHYCARDIA (HCC): Primary | ICD-10-CM

## 2023-05-04 DIAGNOSIS — E78.00 HYPERCHOLESTEREMIA: ICD-10-CM

## 2023-05-04 RX ORDER — DILTIAZEM HYDROCHLORIDE 300 MG/1
300 CAPSULE, COATED, EXTENDED RELEASE ORAL DAILY
Qty: 90 CAPSULE | Refills: 3 | Status: SHIPPED | OUTPATIENT
Start: 2023-05-04

## 2023-05-04 NOTE — PATIENT INSTRUCTIONS
The medication list included in this document is our record of what you are currently taking, including any changes that were made at today's visit. If you find any differences when compared to your medications at home, or have any questions that were not answered at your visit, please contact the office. After the recommended changes have been made in blood pressure medicines, patient advised to keep BP/HR(pulse rate) chart twice daily and bring us results in next 4 to 5 days after he switches to Cardizem  mg. Patient may send the results via \"My Chart\" if desired. Please rest for 5-10 minutes before checking blood pressure. Sit on a comfortable chair without crossing the legs and put your arm on a table. We recommend that you use an upper arm cuff. Check the blood pressure 3 times each time you check the blood pressure and record the lowest reading. If you check the blood pressure in both arms, use the higher reading.

## 2023-05-04 NOTE — PROGRESS NOTES
1. Have you been to the ER, urgent care clinic since your last visit? Hospitalized since your last visit? No    2. Have you seen or consulted any other health care providers outside of the [address] [place] [address] since your last visit? Include any pap smears or colon screening. No    3. Since your last visit, have you had any of the following symptoms? no     .     4. Have you had any blood work, X-rays or cardiac testing? No         5. Where do you normally have your labs drawn? 6. Do you need any refills today?    no

## 2023-05-04 NOTE — PROGRESS NOTES
Fannie Jordan is a 67y.o. year old male. Follow-up of SVT, hypertension, hyperlipidemia  History of diabetes    5/4/2023 No significant chest pain, shortness of breath, edema, dizziness, palpitations or loss of consciousness. No significant palpitations. Blood pressure intermittently elevated at home also specially when he is stressed. Review of Systems      Physical Exam         01/08/21   ECHO ADULT COMPLETE 01/11/2021 1/11/2021    Narrative · LV: Estimated LVEF is 55 - 60%. Visually measured ejection fraction. Normal cavity size and systolic function (ejection fraction normal). Mild   concentric hypertrophy. Mild (grade 1) left ventricular diastolic   dysfunction. · Insufficient TR to estimate pulmonary artery pressure. Signed by: Mookie Horner MD         ASSESSMENT & PLAN    No results found for: CHOL  No results found for: TRIG  No results found for: HDL  No results found for: LDLCHOLESTEROL, LDLCALC  No results found for: LABVLDL, VLDL      current treatment plan is effective, no change in therapy. Patient is a 79 yr old male with htn ,dm and dyslipidemia admitted with SVT- converted to NSR with adenosine. Echo - normal LV function. Currently on diltiazem. No breakthrough symptoms. Effort tolerance is excellent. Continue current medications and follow-up in 1 year. Vivian Rizo was seen today for follow-up. Diagnoses and all orders for this visit:    Supraventricular tachycardia ([address]  -     EKG 12 Lead    Essential (primary) hypertension  -     dilTIAZem (CARDIZEM CD) 300 MG extended release capsule; Take 1 capsule by mouth daily    Hypercholesteremia    Type 2 diabetes mellitus without complication, without long-term current use of insulin ([address]        Pertinent laboratory and test data reviewed and discussed with patient.   See patient instructions also for other medical advice given    Medications Discontinued During This Encounter   Medication Reason

## 2023-05-08 ENCOUNTER — TELEPHONE (OUTPATIENT)
Age: 73
End: 2023-05-08

## 2023-05-08 DIAGNOSIS — E78.00 HYPERCHOLESTEREMIA: Primary | ICD-10-CM

## 2023-05-08 RX ORDER — ROSUVASTATIN CALCIUM 20 MG/1
20 TABLET, COATED ORAL DAILY
COMMUNITY
End: 2023-05-08 | Stop reason: SDUPTHER

## 2023-05-08 RX ORDER — ROSUVASTATIN CALCIUM 20 MG/1
20 TABLET, COATED ORAL DAILY
Qty: 90 TABLET | Refills: 3 | Status: SHIPPED | OUTPATIENT
Start: 2023-05-08

## 2023-06-22 ENCOUNTER — HOSPITAL ENCOUNTER (OUTPATIENT)
Facility: HOSPITAL | Age: 73
Discharge: HOME | End: 2023-06-22
Payer: MEDICARE

## 2023-06-22 DIAGNOSIS — E78.00 HYPERCHOLESTEREMIA: ICD-10-CM

## 2023-06-22 LAB
ALBUMIN SERPL-MCNC: 3.5 G/DL (ref 3.4–5)
ALBUMIN/GLOB SERPL: 0.9 (ref 0.8–1.7)
ALP SERPL-CCNC: 89 U/L (ref 45–117)
ALT SERPL-CCNC: 43 U/L (ref 16–61)
AST SERPL-CCNC: 18 U/L (ref 10–38)
BILIRUB DIRECT SERPL-MCNC: 0.2 MG/DL (ref 0–0.2)
BILIRUB SERPL-MCNC: 0.8 MG/DL (ref 0.2–1)
CHOLEST SERPL-MCNC: 91 MG/DL
GLOBULIN SER CALC-MCNC: 3.9 G/DL (ref 2–4)
HDLC SERPL-MCNC: 29 MG/DL (ref 40–60)
HDLC SERPL: 3.1 (ref 0–5)
LDLC SERPL CALC-MCNC: 51.4 MG/DL (ref 0–100)
LIPID PANEL: ABNORMAL
PROT SERPL-MCNC: 7.4 G/DL (ref 6.4–8.2)
TRIGL SERPL-MCNC: 53 MG/DL
VLDLC SERPL CALC-MCNC: 10.6 MG/DL

## 2023-06-22 PROCEDURE — 80076 HEPATIC FUNCTION PANEL: CPT

## 2023-06-22 PROCEDURE — 80061 LIPID PANEL: CPT

## 2023-06-22 PROCEDURE — 36415 COLL VENOUS BLD VENIPUNCTURE: CPT

## 2024-02-05 ENCOUNTER — HOSPITAL ENCOUNTER (OUTPATIENT)
Facility: HOSPITAL | Age: 74
Discharge: HOME | End: 2024-02-08
Payer: MEDICARE

## 2024-02-05 DIAGNOSIS — Z13.6 ENCOUNTER FOR SCREENING FOR CARDIOVASCULAR DISORDERS: ICD-10-CM

## 2024-02-05 DIAGNOSIS — F17.211 NICOTINE DEPENDENCE, CIGARETTES, IN REMISSION: ICD-10-CM

## 2024-02-05 PROCEDURE — 76706 US ABDL AORTA SCREEN AAA: CPT

## 2024-02-05 NOTE — PROGRESS NOTES
Screening for AAA, Encounter for screening for cardiovascular disorders, Nicotine dependence, cigarettes, in remission

## 2024-03-04 ENCOUNTER — OFFICE VISIT (OUTPATIENT)
Age: 74
End: 2024-03-04
Payer: MEDICARE

## 2024-03-04 VITALS
OXYGEN SATURATION: 98 % | BODY MASS INDEX: 30.05 KG/M2 | HEIGHT: 66 IN | WEIGHT: 187 LBS | HEART RATE: 87 BPM | SYSTOLIC BLOOD PRESSURE: 139 MMHG | DIASTOLIC BLOOD PRESSURE: 71 MMHG

## 2024-03-04 DIAGNOSIS — E78.00 HYPERCHOLESTEREMIA: ICD-10-CM

## 2024-03-04 DIAGNOSIS — I47.10 SUPRAVENTRICULAR TACHYCARDIA: Primary | ICD-10-CM

## 2024-03-04 DIAGNOSIS — E11.9 TYPE 2 DIABETES MELLITUS WITHOUT COMPLICATION, WITHOUT LONG-TERM CURRENT USE OF INSULIN (HCC): ICD-10-CM

## 2024-03-04 DIAGNOSIS — I10 ESSENTIAL (PRIMARY) HYPERTENSION: ICD-10-CM

## 2024-03-04 PROCEDURE — 99214 OFFICE O/P EST MOD 30 MIN: CPT | Performed by: INTERNAL MEDICINE

## 2024-03-04 PROCEDURE — 3046F HEMOGLOBIN A1C LEVEL >9.0%: CPT | Performed by: INTERNAL MEDICINE

## 2024-03-04 PROCEDURE — G8427 DOCREV CUR MEDS BY ELIG CLIN: HCPCS | Performed by: INTERNAL MEDICINE

## 2024-03-04 PROCEDURE — 3017F COLORECTAL CA SCREEN DOC REV: CPT | Performed by: INTERNAL MEDICINE

## 2024-03-04 PROCEDURE — 1036F TOBACCO NON-USER: CPT | Performed by: INTERNAL MEDICINE

## 2024-03-04 PROCEDURE — G8417 CALC BMI ABV UP PARAM F/U: HCPCS | Performed by: INTERNAL MEDICINE

## 2024-03-04 PROCEDURE — 93000 ELECTROCARDIOGRAM COMPLETE: CPT | Performed by: INTERNAL MEDICINE

## 2024-03-04 PROCEDURE — G8484 FLU IMMUNIZE NO ADMIN: HCPCS | Performed by: INTERNAL MEDICINE

## 2024-03-04 PROCEDURE — 1123F ACP DISCUSS/DSCN MKR DOCD: CPT | Performed by: INTERNAL MEDICINE

## 2024-03-04 PROCEDURE — 3078F DIAST BP <80 MM HG: CPT | Performed by: INTERNAL MEDICINE

## 2024-03-04 PROCEDURE — 2022F DILAT RTA XM EVC RTNOPTHY: CPT | Performed by: INTERNAL MEDICINE

## 2024-03-04 PROCEDURE — 3075F SYST BP GE 130 - 139MM HG: CPT | Performed by: INTERNAL MEDICINE

## 2024-03-04 ASSESSMENT — ENCOUNTER SYMPTOMS
GASTROINTESTINAL NEGATIVE: 1
EYES NEGATIVE: 1
RESPIRATORY NEGATIVE: 1

## 2024-03-04 NOTE — PROGRESS NOTES
1. Have you been to the ER, urgent care clinic since your last visit?  Hospitalized since your last visit?           2.  Where do you normally have your labs drawn?       3. Do you need any refills today?   no    4. Which local pharmacy do you use (enter pharmacy)?   [facility]     5. Which mail order pharmacy do you use (enter pharmacy)?   [facility]     6. Are you here for surgical clearance and if so who will be doing your     procedure/surgery (care team)?   no      
fraction normal). Mild   concentric hypertrophy. Mild (grade 1) left ventricular diastolic   dysfunction.  · Insufficient TR to estimate pulmonary artery pressure.        Signed by: Mira Espino MD         ASSESSMENT & PLAN    Lab Results   Component Value Date    CHOL 91 06/22/2023     Lab Results   Component Value Date    TRIG 53 06/22/2023     Lab Results   Component Value Date    HDL 29 (L) 06/22/2023     Lab Results   Component Value Date    LDLCALC 51.4 06/22/2023     No results found for: \"VLDL\"      current treatment plan is effective, no change in therapy.    Patient is a 70 yr old male with htn ,dm and dyslipidemia admitted with SVT- converted to NSR with adenosine.  Echo - normal LV function.  Currently on diltiazem.  No breakthrough symptoms.  Effort tolerance is excellent.  Continue current medications and follow-up in 1 year.    5/4/2023 no recurrence of SVT clinically.  Blood pressure is mildly elevated.  Increase Cardizem to 300 mg a day and follow the home chart.  I do not have his lipids available and will call his PCP but patient informed me that they are doing good.  And discussion on diet and hypertension.  Recommended to reduce the salty foods like processed meats.    Anthony was seen today for follow-up.    Diagnoses and all orders for this visit:    Supraventricular tachycardia  -     EKG 12 Lead    Essential (primary) hypertension  -     Hepatic Function Panel; Future  -     Basic Metabolic Panel; Future  -     Lipid Panel; Future    Hypercholesteremia  -     Lipid Panel; Future    Type 2 diabetes mellitus without complication, without long-term current use of insulin (HCC)          See patient instructions also for other medical advice given    Medications Discontinued During This Encounter   Medication Reason    HYDROcodone-acetaminophen (NORCO) 5-325 MG per tablet     simvastatin (ZOCOR) 20 MG tablet Alternate therapy       Follow-up and Dispositions    Return in about 1 year (around

## 2024-03-14 ENCOUNTER — HOSPITAL ENCOUNTER (OUTPATIENT)
Facility: HOSPITAL | Age: 74
End: 2024-03-14
Payer: MEDICARE

## 2024-03-14 ENCOUNTER — TELEPHONE (OUTPATIENT)
Age: 74
End: 2024-03-14

## 2024-03-14 ENCOUNTER — HOSPITAL ENCOUNTER (OUTPATIENT)
Facility: HOSPITAL | Age: 74
Discharge: HOME | End: 2024-03-14
Payer: MEDICARE

## 2024-03-14 ENCOUNTER — TRANSCRIBE ORDERS (OUTPATIENT)
Facility: HOSPITAL | Age: 74
End: 2024-03-14

## 2024-03-14 DIAGNOSIS — E78.5 HYPERLIPIDEMIA, UNSPECIFIED HYPERLIPIDEMIA TYPE: ICD-10-CM

## 2024-03-14 DIAGNOSIS — E11.65 TYPE 2 DIABETES MELLITUS WITH HYPERGLYCEMIA, UNSPECIFIED WHETHER LONG TERM INSULIN USE (HCC): ICD-10-CM

## 2024-03-14 DIAGNOSIS — I10 ESSENTIAL (PRIMARY) HYPERTENSION: ICD-10-CM

## 2024-03-14 DIAGNOSIS — E11.65 TYPE 2 DIABETES MELLITUS WITH HYPERGLYCEMIA, UNSPECIFIED WHETHER LONG TERM INSULIN USE (HCC): Primary | ICD-10-CM

## 2024-03-14 DIAGNOSIS — E78.00 HYPERCHOLESTEREMIA: ICD-10-CM

## 2024-03-14 LAB
ALBUMIN SERPL-MCNC: 3.7 G/DL (ref 3.4–5)
ALBUMIN SERPL-MCNC: 3.8 G/DL (ref 3.4–5)
ALBUMIN/GLOB SERPL: 0.9 (ref 0.8–1.7)
ALBUMIN/GLOB SERPL: 1 (ref 0.8–1.7)
ALP SERPL-CCNC: 68 U/L (ref 45–117)
ALP SERPL-CCNC: 71 U/L (ref 45–117)
ALT SERPL-CCNC: 53 U/L (ref 16–61)
ALT SERPL-CCNC: 53 U/L (ref 16–61)
ANION GAP SERPL CALC-SCNC: 6 MMOL/L (ref 3–18)
ANION GAP SERPL CALC-SCNC: 7 MMOL/L (ref 3–18)
AST SERPL-CCNC: 27 U/L (ref 10–38)
AST SERPL-CCNC: 28 U/L (ref 10–38)
BILIRUB DIRECT SERPL-MCNC: 0.2 MG/DL (ref 0–0.2)
BILIRUB SERPL-MCNC: 0.6 MG/DL (ref 0.2–1)
BILIRUB SERPL-MCNC: 0.6 MG/DL (ref 0.2–1)
BUN SERPL-MCNC: 20 MG/DL (ref 7–18)
BUN SERPL-MCNC: 21 MG/DL (ref 7–18)
BUN/CREAT SERPL: 16 (ref 12–20)
BUN/CREAT SERPL: 18 (ref 12–20)
CALCIUM SERPL-MCNC: 9.6 MG/DL (ref 8.5–10.1)
CALCIUM SERPL-MCNC: 9.7 MG/DL (ref 8.5–10.1)
CHLORIDE SERPL-SCNC: 104 MMOL/L (ref 100–111)
CHLORIDE SERPL-SCNC: 104 MMOL/L (ref 100–111)
CHOLEST SERPL-MCNC: 111 MG/DL
CHOLEST SERPL-MCNC: 113 MG/DL
CO2 SERPL-SCNC: 30 MMOL/L (ref 21–32)
CO2 SERPL-SCNC: 30 MMOL/L (ref 21–32)
CREAT SERPL-MCNC: 1.17 MG/DL (ref 0.6–1.3)
CREAT SERPL-MCNC: 1.24 MG/DL (ref 0.6–1.3)
EST. AVERAGE GLUCOSE BLD GHB EST-MCNC: 151 MG/DL
GLOBULIN SER CALC-MCNC: 3.9 G/DL (ref 2–4)
GLOBULIN SER CALC-MCNC: 4.1 G/DL (ref 2–4)
GLUCOSE SERPL-MCNC: 141 MG/DL (ref 74–99)
GLUCOSE SERPL-MCNC: 150 MG/DL (ref 74–99)
HBA1C MFR BLD: 6.9 % (ref 4.2–5.6)
HDLC SERPL-MCNC: 34 MG/DL (ref 40–60)
HDLC SERPL-MCNC: 35 MG/DL (ref 40–60)
HDLC SERPL: 3.2 (ref 0–5)
HDLC SERPL: 3.3 (ref 0–5)
LDLC SERPL CALC-MCNC: 56.6 MG/DL (ref 0–100)
LDLC SERPL CALC-MCNC: 58.8 MG/DL (ref 0–100)
LIPID PANEL: ABNORMAL
LIPID PANEL: ABNORMAL
POTASSIUM SERPL-SCNC: 3.9 MMOL/L (ref 3.5–5.5)
POTASSIUM SERPL-SCNC: 3.9 MMOL/L (ref 3.5–5.5)
PROT SERPL-MCNC: 7.7 G/DL (ref 6.4–8.2)
PROT SERPL-MCNC: 7.8 G/DL (ref 6.4–8.2)
SODIUM SERPL-SCNC: 140 MMOL/L (ref 136–145)
SODIUM SERPL-SCNC: 141 MMOL/L (ref 136–145)
TRIGL SERPL-MCNC: 101 MG/DL
TRIGL SERPL-MCNC: 97 MG/DL
VLDLC SERPL CALC-MCNC: 19.4 MG/DL
VLDLC SERPL CALC-MCNC: 20.2 MG/DL

## 2024-03-14 PROCEDURE — 80061 LIPID PANEL: CPT

## 2024-03-14 PROCEDURE — 80076 HEPATIC FUNCTION PANEL: CPT

## 2024-03-14 PROCEDURE — 83036 HEMOGLOBIN GLYCOSYLATED A1C: CPT

## 2024-03-14 PROCEDURE — 36415 COLL VENOUS BLD VENIPUNCTURE: CPT

## 2024-03-14 PROCEDURE — 80053 COMPREHEN METABOLIC PANEL: CPT

## 2024-03-14 NOTE — TELEPHONE ENCOUNTER
Spoke with patient regarding message below, patient voiced understanding and acceptance of advice. Will fax labs to Dr. Sangita Brennan     ----- Message from Roberto Hairston MD sent at 3/14/2024 10:32 AM EDT -----  Please contact patient and do the following asap    Fax to PCP for increased glucose

## 2024-05-07 DIAGNOSIS — I10 ESSENTIAL (PRIMARY) HYPERTENSION: ICD-10-CM

## 2024-05-07 RX ORDER — DILTIAZEM HYDROCHLORIDE 300 MG/1
300 CAPSULE, COATED, EXTENDED RELEASE ORAL DAILY
Qty: 90 CAPSULE | Refills: 2 | Status: SHIPPED | OUTPATIENT
Start: 2024-05-07

## 2025-07-10 ENCOUNTER — OFFICE VISIT (OUTPATIENT)
Age: 75
End: 2025-07-10
Payer: MEDICARE

## 2025-07-10 VITALS
HEART RATE: 78 BPM | OXYGEN SATURATION: 97 % | WEIGHT: 185 LBS | BODY MASS INDEX: 29.73 KG/M2 | SYSTOLIC BLOOD PRESSURE: 128 MMHG | HEIGHT: 66 IN | DIASTOLIC BLOOD PRESSURE: 64 MMHG

## 2025-07-10 DIAGNOSIS — I10 ESSENTIAL (PRIMARY) HYPERTENSION: ICD-10-CM

## 2025-07-10 DIAGNOSIS — E11.9 TYPE 2 DIABETES MELLITUS WITHOUT COMPLICATION, WITHOUT LONG-TERM CURRENT USE OF INSULIN (HCC): ICD-10-CM

## 2025-07-10 DIAGNOSIS — E78.00 HYPERCHOLESTEREMIA: ICD-10-CM

## 2025-07-10 DIAGNOSIS — I47.10 SUPRAVENTRICULAR TACHYCARDIA: Primary | ICD-10-CM

## 2025-07-10 PROCEDURE — 1036F TOBACCO NON-USER: CPT | Performed by: INTERNAL MEDICINE

## 2025-07-10 PROCEDURE — 2022F DILAT RTA XM EVC RTNOPTHY: CPT | Performed by: INTERNAL MEDICINE

## 2025-07-10 PROCEDURE — 1123F ACP DISCUSS/DSCN MKR DOCD: CPT | Performed by: INTERNAL MEDICINE

## 2025-07-10 PROCEDURE — 1159F MED LIST DOCD IN RCRD: CPT | Performed by: INTERNAL MEDICINE

## 2025-07-10 PROCEDURE — G8419 CALC BMI OUT NRM PARAM NOF/U: HCPCS | Performed by: INTERNAL MEDICINE

## 2025-07-10 PROCEDURE — 93000 ELECTROCARDIOGRAM COMPLETE: CPT | Performed by: INTERNAL MEDICINE

## 2025-07-10 PROCEDURE — 99214 OFFICE O/P EST MOD 30 MIN: CPT | Performed by: INTERNAL MEDICINE

## 2025-07-10 PROCEDURE — 3074F SYST BP LT 130 MM HG: CPT | Performed by: INTERNAL MEDICINE

## 2025-07-10 PROCEDURE — 3078F DIAST BP <80 MM HG: CPT | Performed by: INTERNAL MEDICINE

## 2025-07-10 PROCEDURE — 3046F HEMOGLOBIN A1C LEVEL >9.0%: CPT | Performed by: INTERNAL MEDICINE

## 2025-07-10 PROCEDURE — 1160F RVW MEDS BY RX/DR IN RCRD: CPT | Performed by: INTERNAL MEDICINE

## 2025-07-10 PROCEDURE — G8427 DOCREV CUR MEDS BY ELIG CLIN: HCPCS | Performed by: INTERNAL MEDICINE

## 2025-07-10 PROCEDURE — 3017F COLORECTAL CA SCREEN DOC REV: CPT | Performed by: INTERNAL MEDICINE

## 2025-07-10 RX ORDER — CYANOCOBALAMIN (VITAMIN B-12) 500 MCG
TABLET ORAL
COMMUNITY

## 2025-07-10 RX ORDER — TAMSULOSIN HYDROCHLORIDE 0.4 MG/1
0.4 CAPSULE ORAL DAILY
COMMUNITY

## 2025-07-10 ASSESSMENT — ENCOUNTER SYMPTOMS
GASTROINTESTINAL NEGATIVE: 1
EYES NEGATIVE: 1
RESPIRATORY NEGATIVE: 1

## 2025-07-10 NOTE — PROGRESS NOTES
1. Have you been to the ER, urgent care clinic since your last visit?  Hospitalized since your last visit?     no      2.  Where do you normally have your labs drawn?       3. Do you need any refills today?   yes    4. Which local pharmacy do you use (enter pharmacy)?   [facility]    5. Which mail order pharmacy do you use (enter pharmacy)?   [facility]     6. Are you here for surgical clearance and if so who will be doing your     procedure/surgery (care team)?   no      
maintenance.      01/08/21   ECHO ADULT COMPLETE 01/11/2021 1/11/2021    Narrative · LV: Estimated LVEF is 55 - 60%. Visually measured ejection fraction.   Normal cavity size and systolic function (ejection fraction normal). Mild   concentric hypertrophy. Mild (grade 1) left ventricular diastolic   dysfunction.  · Insufficient TR to estimate pulmonary artery pressure.        Signed by: Mira Espino MD       ASSESSMENT & PLAN    Lab Results   Component Value Date    CHOL 113 03/14/2024    CHOL 111 03/14/2024    CHOL 91 06/22/2023     Lab Results   Component Value Date    TRIG 101 03/14/2024    TRIG 97 03/14/2024    TRIG 53 06/22/2023     Lab Results   Component Value Date    HDL 34 (L) 03/14/2024    HDL 35 (L) 03/14/2024    HDL 29 (L) 06/22/2023     Lab Results   Component Value Date    LDL 58.8 03/14/2024    LDL 56.6 03/14/2024    LDL 51.4 06/22/2023     Lab Results   Component Value Date    VLDL 20.2 03/14/2024    VLDL 19.4 03/14/2024    VLDL 10.6 06/22/2023       Pertinent laboratory and test data reviewed and discussed with patient.      Patient is a 70 yr old male with htn ,dm and dyslipidemia admitted with SVT- converted to NSR with adenosine.  Echo - normal LV function.  Currently on diltiazem.  No breakthrough symptoms.  Effort tolerance is excellent.  Continue current medications and follow-up in 1 year.    5/4/2023 no recurrence of SVT clinically.  Blood pressure is mildly elevated.  Increase Cardizem to 300 mg a day and follow the home chart.  I do not have his lipids available and will call his PCP but patient informed me that they are doing good.  And discussion on diet and hypertension.  Recommended to reduce the salty foods like processed meats.    3/4/2024  Plan for medicines : Blood pressure is controlled.  Continue Cardizem chlorthalidone and valsartan.  Follow-up CMP and lipids.  SVT is stable.  Continue Cardizem.  Exercise Plan: Try to walk 30 to 40 minutes a day.  Diet plan: Mediterranean